# Patient Record
Sex: MALE | Race: BLACK OR AFRICAN AMERICAN | NOT HISPANIC OR LATINO | Employment: FULL TIME | URBAN - NONMETROPOLITAN AREA
[De-identification: names, ages, dates, MRNs, and addresses within clinical notes are randomized per-mention and may not be internally consistent; named-entity substitution may affect disease eponyms.]

---

## 2018-03-21 ENCOUNTER — HOSPITAL ENCOUNTER (EMERGENCY)
Facility: HOSPITAL | Age: 28
Discharge: HOME | End: 2018-03-21
Admitting: EMERGENCY MEDICINE

## 2018-03-21 ENCOUNTER — APPOINTMENT (OUTPATIENT)
Dept: GENERAL RADIOLOGY | Facility: HOSPITAL | Age: 28
End: 2018-03-21

## 2018-03-21 VITALS
DIASTOLIC BLOOD PRESSURE: 83 MMHG | HEART RATE: 73 BPM | TEMPERATURE: 98.1 F | WEIGHT: 144 LBS | BODY MASS INDEX: 21.82 KG/M2 | RESPIRATION RATE: 18 BRPM | HEIGHT: 68 IN | OXYGEN SATURATION: 100 % | SYSTOLIC BLOOD PRESSURE: 129 MMHG

## 2018-03-21 DIAGNOSIS — S39.012A STRAIN OF LUMBAR REGION, INITIAL ENCOUNTER: ICD-10-CM
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
## 2018-03-21 PROCEDURE — 99282 EMERGENCY DEPT VISIT SF MDM: CPT

## 2018-03-21 PROCEDURE — 72110 X-RAY EXAM L-2 SPINE 4/>VWS: CPT

## 2018-03-21 RX ORDER — IBUPROFEN 800 MG/1
800 TABLET ORAL
Qty: 30 TABLET | Refills: 0 | OUTPATIENT
Start: 2018-03-21 | End: 2020-10-09

## 2018-03-21 RX ORDER — CYCLOBENZAPRINE HCL 10 MG
10 TABLET ORAL 3 TIMES DAILY PRN
Qty: 21 TABLET | Refills: 0 | Status: SHIPPED | OUTPATIENT
Start: 2018-03-21 | End: 2021-01-24
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
## 2018-07-15 ENCOUNTER — HOSPITAL ENCOUNTER (EMERGENCY)
Facility: HOSPITAL | Age: 28
Discharge: HOME | End: 2018-07-15
Admitting: EMERGENCY MEDICINE

## 2018-07-15 ENCOUNTER — APPOINTMENT (OUTPATIENT)
Dept: GENERAL RADIOLOGY | Facility: HOSPITAL | Age: 28
End: 2018-07-15

## 2018-07-15 VITALS
HEART RATE: 60 BPM | TEMPERATURE: 98.5 F | OXYGEN SATURATION: 99 % | DIASTOLIC BLOOD PRESSURE: 64 MMHG | HEIGHT: 68 IN | RESPIRATION RATE: 18 BRPM | WEIGHT: 150 LBS | SYSTOLIC BLOOD PRESSURE: 125 MMHG | BODY MASS INDEX: 22.73 KG/M2

## 2018-07-15 DIAGNOSIS — S93.602A FOOT SPRAIN, LEFT, INITIAL ENCOUNTER: Primary | ICD-10-CM

## 2018-07-15 PROCEDURE — 73630 X-RAY EXAM OF FOOT: CPT

## 2018-07-15 PROCEDURE — 99283 EMERGENCY DEPT VISIT LOW MDM: CPT

## 2018-07-15 NOTE — DISCHARGE INSTRUCTIONS
Return to ER if symptoms worsen   Ice to foot for 24 hours, then moist heat compresses three times a day for 15-20 min intervals      Cryotherapy  WHAT IS CRYOTHERAPY?  Cryotherapy, or cold therapy, is a treatment that uses cold temperatures to treat an injury or medical condition. It includes using cold packs or ice packs to reduce pain and swelling. Only use cryotherapy if your doctor says it is okay.  HOW DO I USE CRYOTHERAPY?  · Place a towel between the cold source and your skin.  · Apply the cold source for no more than 20 minutes at a time.  · Check your skin after 5 minutes to make sure there are no signs of a poor response to cold or skin damage. Check for:  ? White spots on your skin. Your skin may look blotchy or mottled.  ? Skin that looks blue or pale.  ? Skin that feels waxy or hard.  · Repeat these steps as many times each day as told by your doctor.    HOW CAN I MAKE A COLD PACK?  When using a cold pack at home to reduce pain and swelling, you can use:  · A silica gel cold pack that has been left in the freezer. You can buy this online or in stores.  · A plastic bag of frozen vegetables.  · A sealable plastic bag that has been filled with crushed ice.    Always wrap the pack in a dry or damp towel to avoid direct contact with your skin.  WHEN SHOULD I CALL MY DOCTOR?  Call your doctor if:  · You start to have white spots on your skin. This may give your skin a blotchy or mottled look.  · Your skin turns blue or pale.  · Your skin becomes waxy or hard.  · Your swelling gets worse.    This information is not intended to replace advice given to you by your health care provider. Make sure you discuss any questions you have with your health care provider.  Document Released: 06/05/2009 Document Revised: 05/25/2017 Document Reviewed: 08/31/2016  [employer] Interactive Patient Education © 2017 [employer]    Foot Sprain  A foot sprain is an injury to one of the strong bands of tissue (ligaments) that  connect and support the many bones in your feet. The ligament can be stretched too much or it can tear. A tear can be either partial or complete. The severity of the sprain depends on how much of the ligament was damaged or torn.  What are the causes?  A foot sprain is usually caused by suddenly twisting or pivoting your foot.  What increases the risk?  This injury is more likely to occur in people who:  · Play a sport, such as basketball or football.  · Exercise or play a sport without warming up.  · Start a new workout or sport.  · Suddenly increase how long or hard they exercise or play a sport.    What are the signs or symptoms?  Symptoms of this condition start soon after an injury and include:  · Pain, especially in the arch of the foot.  · Bruising.  · Swelling.  · Inability to walk or use the foot to support body weight.    How is this diagnosed?  This condition is diagnosed with a medical history and physical exam. You may also have imaging tests, such as:  · X-rays to make sure there are no broken bones (fractures).  · MRI to see if the ligament has torn.    How is this treated?  Treatment varies depending on the severity of your sprain. Mild sprains can be treated with rest, ice, compression, and elevation (RICE). If your ligament is overstretched or partially torn, treatment usually involves keeping your foot in a fixed position (immobilization) for a period of time. To help you do this, your health care provider will apply a bandage, splint, or walking boot to keep your foot from moving until it heals. You may also be advised to use crutches or a scooter for a few weeks to avoid bearing weight on your foot while it is healing.  If your ligament is fully torn, you may need surgery to reconnect the ligament to the bone. After surgery, a cast or splint will be applied and will need to stay on your foot while it heals.  Your health care provider may also suggest exercises or physical therapy to strengthen  your foot.  Follow these instructions at home:  If You Have a Bandage, Splint, or Walking Boot:  · Wear it as directed by your health care provider. Remove it only as directed by your health care provider.  · Loosen the bandage, splint, or walking boot if your toes become numb and tingle, or if they turn cold and blue.  Bathing  · If your health care provider approves bathing and showering, cover the bandage or splint with a watertight plastic bag to protect it from water. Do not let the bandage or splint get wet.  Managing pain, stiffness, and swelling  · If directed, apply ice to the injured area:  ? Put ice in a plastic bag.  ? Place a towel between your skin and the bag.  ? Leave the ice on for 20 minutes, 2-3 times per day.  · Move your toes often to avoid stiffness and to lessen swelling.  · Raise (elevate) the injured area above the level of your heart while you are sitting or lying down.  Driving  · Do not drive or operate heavy machinery while taking pain medicine.  · Ask your health care provider when it is safe to drive if you have a bandage, splint, or walking boot on your foot.  Activity  · Rest as directed by your health care provider.  · Do not use the injured foot to support your body weight until your health care provider says that you can. Use crutches or other supportive devices as directed by your health care provider.  · Ask your health care provider what activities are safe for you. Gradually increase how much and how far you walk until your health care provider says it is safe to return to full activity.  · Do any exercise or physical therapy as directed by your health care provider.  General instructions  · If a splint was applied, do not put pressure on any part of it until it is fully hardened. This may take several hours.  · Take medicines only as directed by your health care provider. These include over-the-counter medicines and prescription medicines.  · Keep all follow-up visits as  directed by your health care provider. This is important.  · When you can walk without pain, wear supportive shoes that have stiff soles. Do not wear flip-flops, and do not walk barefoot.  Contact a health care provider if:  · Your pain is not controlled with medicine.  · Your bruising or swelling gets worse or does not get better with treatment.  · Your splint or walking boot is damaged.  Get help right away if:  · You develop severe numbness or tingling in your foot.  · Your foot turns blue, white, or gray, and it feels cold.  This information is not intended to replace advice given to you by your health care provider. Make sure you discuss any questions you have with your health care provider.  Document Released: 06/09/2003 Document Revised: 05/25/2017 Document Reviewed: 10/21/2015  [employer] Interactive Patient Education © 2018 [employer]

## 2018-07-15 NOTE — ED PROVIDER NOTES
Subjective   Patient is 27-year-old white male presents with left foot pain for the past 3 days.  He denies any known injury.  He states he does work [employer] and does not know if he hurt it while working. He states that most of the pain is to the base of the 4th and 5th toes.         History provided by:  Patient   used: No        Review of Systems   Constitutional: Negative.    HENT: Negative.    Eyes: Negative.    Respiratory: Negative.    Cardiovascular: Negative.    Gastrointestinal: Negative.    Endocrine: Negative.    Genitourinary: Negative.    Musculoskeletal:        Patient is 27-year-old white male presents with left foot pain for the past 3 days.  He denies any known injury.  He states he does work [employer] and does not know if he hurt it while working. He states that most of the pain is to the base of the 4th and 5th toes.      Skin: Negative.    Allergic/Immunologic: Negative.    Neurological: Negative.    Hematological: Negative.    Psychiatric/Behavioral: Negative.    All other systems reviewed and are negative.      Past Medical History:   Diagnosis Date   • Patient denies medical problems        No Known Allergies    Past Surgical History:   Procedure Laterality Date   • NO PAST SURGERIES         History reviewed. No pertinent family history.    Social History     Social History   • Marital status: Single     Social History Main Topics   • Smoking status: Current Every Day Smoker     Packs/day: 0.50     Types: Cigarettes   • Smokeless tobacco: Never Used   • Alcohol use No   • Drug use: No   • Sexual activity: Defer     Other Topics Concern   • Not on file       Prior to Admission medications    Medication Sig Start Date End Date Taking? Authorizing Provider   cyclobenzaprine (FLEXERIL) 10 MG tablet Take 1 tablet by mouth 3 (Three) Times a Day As Needed for Muscle Spasms. 3/21/18   Thu-SHADY Bowers   ibuprofen (ADVIL,MOTRIN) 800 MG tablet Take 1 tablet by  "mouth 3 (Three) Times a Day With Meals. 3/21/18   Thu-Concepción SHADY Price       /64   Pulse 60   Temp 98.5 °F (36.9 °C)   Resp 18   Ht 172.7 cm (68\")   Wt 68 kg (150 lb)   SpO2 99%   BMI 22.81 kg/m²     Objective   Physical Exam   Constitutional: He is oriented to person, place, and time. He appears well-developed and well-nourished.   HENT:   Head: Normocephalic and atraumatic.   Eyes: Conjunctivae and EOM are normal. Pupils are equal, round, and reactive to light.   Neck: Normal range of motion. Neck supple.   Cardiovascular: Normal rate, regular rhythm, normal heart sounds and intact distal pulses.    Pulmonary/Chest: Effort normal and breath sounds normal.   Abdominal: Soft. Bowel sounds are normal.   Musculoskeletal:   Left foot: tenderness on palpation of dorsal aspect of left foot at base of 4th and fifth digits. Mild soft tissue swelling noted. Pedal pulses palp. dtrs intact   Neurological: He is alert and oriented to person, place, and time. He has normal reflexes.   Skin: Skin is warm and dry.   Psychiatric: He has a normal mood and affect. His behavior is normal. Judgment and thought content normal.   Nursing note and vitals reviewed.      Procedures         Lab Results (last 24 hours)     ** No results found for the last 24 hours. **          XR Foot 3+ View Left   Final Result   No acute bony abnormality appreciated.   This report was finalized on 07/15/2018 10:35 by Dr. Pete Zendejas MD.          ED Course  ED Course as of Jul 16 1213   Sun Jul 15, 2018   1028 Reviewed results with pt. Pt will be placed in ortho boot and advised if no improvement in 3 days, needs to follow up with pcp. Will be discharged shortly in stable condition   [CW]      ED Course User Index  [CW] Roxana Turcios, JOSE          MDM  Number of Diagnoses or Management Options  Foot sprain, left, initial encounter: minor     Amount and/or Complexity of Data Reviewed  Tests in the radiology section of CPT®: " ordered and reviewed    Patient Progress  Patient progress: stable      Final diagnoses:   Foot sprain, left, initial encounter          Roxana Turcios, APRN  07/16/18 [id number]

## 2020-10-09 ENCOUNTER — APPOINTMENT (OUTPATIENT)
Dept: GENERAL RADIOLOGY | Facility: HOSPITAL | Age: 30
End: 2020-10-09

## 2020-10-09 ENCOUNTER — HOSPITAL ENCOUNTER (EMERGENCY)
Facility: HOSPITAL | Age: 30
Discharge: HOME | End: 2020-10-09
Admitting: EMERGENCY MEDICINE

## 2020-10-09 VITALS
WEIGHT: 142 LBS | RESPIRATION RATE: 16 BRPM | TEMPERATURE: 98.9 F | BODY MASS INDEX: 21.52 KG/M2 | SYSTOLIC BLOOD PRESSURE: 121 MMHG | HEART RATE: 61 BPM | OXYGEN SATURATION: 100 % | DIASTOLIC BLOOD PRESSURE: 70 MMHG | HEIGHT: 68 IN

## 2020-10-09 DIAGNOSIS — L02.619 FOOT ABSCESS: Primary | ICD-10-CM

## 2020-10-09 LAB
ALBUMIN SERPL-MCNC: 4.5 G/DL (ref 3.5–5.2)
ALBUMIN/GLOB SERPL: 1.5 G/DL
ALP SERPL-CCNC: 73 U/L (ref 39–117)
ALT SERPL W P-5'-P-CCNC: 7 U/L (ref 1–41)
AMPHET+METHAMPHET UR QL: NEGATIVE
AMPHETAMINES UR QL: NEGATIVE
ANION GAP SERPL CALCULATED.3IONS-SCNC: 4 MMOL/L (ref 5–15)
AST SERPL-CCNC: 17 U/L (ref 1–40)
BARBITURATES UR QL SCN: NEGATIVE
BASOPHILS # BLD AUTO: 0.03 10*3/MM3 (ref 0–0.2)
BASOPHILS NFR BLD AUTO: 0.7 % (ref 0–1.5)
BENZODIAZ UR QL SCN: NEGATIVE
BILIRUB SERPL-MCNC: 0.9 MG/DL (ref 0–1.2)
BUN SERPL-MCNC: 9 MG/DL (ref 6–20)
BUN/CREAT SERPL: 11.3 (ref 7–25)
BUPRENORPHINE SERPL-MCNC: NEGATIVE NG/ML
CALCIUM SPEC-SCNC: 9.6 MG/DL (ref 8.6–10.5)
CANNABINOIDS SERPL QL: POSITIVE
CHLORIDE SERPL-SCNC: 102 MMOL/L (ref 98–107)
CO2 SERPL-SCNC: 29 MMOL/L (ref 22–29)
COCAINE UR QL: NEGATIVE
CREAT SERPL-MCNC: 0.8 MG/DL (ref 0.76–1.27)
D-LACTATE SERPL-SCNC: 0.8 MMOL/L (ref 0.5–2)
DEPRECATED RDW RBC AUTO: 44.2 FL (ref 37–54)
EOSINOPHIL # BLD AUTO: 0.06 10*3/MM3 (ref 0–0.4)
EOSINOPHIL NFR BLD AUTO: 1.5 % (ref 0.3–6.2)
ERYTHROCYTE [DISTWIDTH] IN BLOOD BY AUTOMATED COUNT: 12.9 % (ref 12.3–15.4)
GFR SERPL CREATININE-BSD FRML MDRD: 140 ML/MIN/1.73
GLOBULIN UR ELPH-MCNC: 3 GM/DL
GLUCOSE SERPL-MCNC: 115 MG/DL (ref 65–99)
HCT VFR BLD AUTO: 42.8 % (ref 37.5–51)
HGB BLD-MCNC: 14.7 G/DL (ref 13–17.7)
HOLD SPECIMEN: NORMAL
IMM GRANULOCYTES # BLD AUTO: 0 10*3/MM3 (ref 0–0.05)
IMM GRANULOCYTES NFR BLD AUTO: 0 % (ref 0–0.5)
LYMPHOCYTES # BLD AUTO: 1.53 10*3/MM3 (ref 0.7–3.1)
LYMPHOCYTES NFR BLD AUTO: 38.1 % (ref 19.6–45.3)
MCH RBC QN AUTO: 32.4 PG (ref 26.6–33)
MCHC RBC AUTO-ENTMCNC: 34.3 G/DL (ref 31.5–35.7)
MCV RBC AUTO: 94.3 FL (ref 79–97)
METHADONE UR QL SCN: NEGATIVE
MONOCYTES # BLD AUTO: 0.53 10*3/MM3 (ref 0.1–0.9)
MONOCYTES NFR BLD AUTO: 13.2 % (ref 5–12)
NEUTROPHILS NFR BLD AUTO: 1.87 10*3/MM3 (ref 1.7–7)
NEUTROPHILS NFR BLD AUTO: 46.5 % (ref 42.7–76)
NRBC BLD AUTO-RTO: 0 /100 WBC (ref 0–0.2)
OPIATES UR QL: POSITIVE
OXYCODONE UR QL SCN: NEGATIVE
PCP UR QL SCN: NEGATIVE
PLATELET # BLD AUTO: 276 10*3/MM3 (ref 140–450)
PMV BLD AUTO: 9.9 FL (ref 6–12)
POTASSIUM SERPL-SCNC: 3.7 MMOL/L (ref 3.5–5.2)
PROPOXYPH UR QL: NEGATIVE
PROT SERPL-MCNC: 7.5 G/DL (ref 6–8.5)
RBC # BLD AUTO: 4.54 10*6/MM3 (ref 4.14–5.8)
SODIUM SERPL-SCNC: 135 MMOL/L (ref 136–145)
TRICYCLICS UR QL SCN: NEGATIVE
WBC # BLD AUTO: 4.02 10*3/MM3 (ref 3.4–10.8)
WHOLE BLOOD HOLD SPECIMEN: NORMAL
WHOLE BLOOD HOLD SPECIMEN: NORMAL

## 2020-10-09 PROCEDURE — 25010000002 TDAP 5-2.5-18.5 LF-MCG/0.5 SUSPENSION: Performed by: NURSE PRACTITIONER

## 2020-10-09 PROCEDURE — 96365 THER/PROPH/DIAG IV INF INIT: CPT

## 2020-10-09 PROCEDURE — 87040 BLOOD CULTURE FOR BACTERIA: CPT | Performed by: NURSE PRACTITIONER

## 2020-10-09 PROCEDURE — 83605 ASSAY OF LACTIC ACID: CPT | Performed by: NURSE PRACTITIONER

## 2020-10-09 PROCEDURE — 99283 EMERGENCY DEPT VISIT LOW MDM: CPT

## 2020-10-09 PROCEDURE — 36415 COLL VENOUS BLD VENIPUNCTURE: CPT

## 2020-10-09 PROCEDURE — 90471 IMMUNIZATION ADMIN: CPT | Performed by: NURSE PRACTITIONER

## 2020-10-09 PROCEDURE — 90715 TDAP VACCINE 7 YRS/> IM: CPT | Performed by: NURSE PRACTITIONER

## 2020-10-09 PROCEDURE — 85025 COMPLETE CBC W/AUTO DIFF WBC: CPT | Performed by: NURSE PRACTITIONER

## 2020-10-09 PROCEDURE — 80306 DRUG TEST PRSMV INSTRMNT: CPT | Performed by: NURSE PRACTITIONER

## 2020-10-09 PROCEDURE — 80053 COMPREHEN METABOLIC PANEL: CPT | Performed by: NURSE PRACTITIONER

## 2020-10-09 PROCEDURE — 73630 X-RAY EXAM OF FOOT: CPT

## 2020-10-09 RX ORDER — CLINDAMYCIN HYDROCHLORIDE 300 MG/1
300 CAPSULE ORAL 3 TIMES DAILY
Qty: 21 CAPSULE | Refills: 0 | Status: SHIPPED | OUTPATIENT
Start: 2020-10-09 | End: 2020-10-16

## 2020-10-09 RX ORDER — NAPROXEN 500 MG/1
500 TABLET ORAL 2 TIMES DAILY PRN
Qty: 10 TABLET | Refills: 0 | Status: SHIPPED | OUTPATIENT
Start: 2020-10-09 | End: 2020-10-14

## 2020-10-09 RX ORDER — CLINDAMYCIN PHOSPHATE 900 MG/50ML
900 INJECTION INTRAVENOUS ONCE
Status: COMPLETED | OUTPATIENT
Start: 2020-10-09 | End: 2020-10-09

## 2020-10-09 RX ORDER — SODIUM CHLORIDE 0.9 % (FLUSH) 0.9 %
10 SYRINGE (ML) INJECTION AS NEEDED
Status: DISCONTINUED | OUTPATIENT
Start: 2020-10-09 | End: 2020-10-09 | Stop reason: HOSPADM

## 2020-10-09 RX ADMIN — TETANUS TOXOID, REDUCED DIPHTHERIA TOXOID AND ACELLULAR PERTUSSIS VACCINE, ADSORBED 0.5 ML: 5; 2.5; 8; 8; 2.5 SUSPENSION INTRAMUSCULAR at 13:57

## 2020-10-09 RX ADMIN — CLINDAMYCIN IN 5 PERCENT DEXTROSE 900 MG: 18 INJECTION, SOLUTION INTRAVENOUS at 12:26

## 2020-10-09 NOTE — ED PROVIDER NOTES
Subjective   Patient is a 29-year-old male that presents to the ER today from urgent care with complaint of foot pain.  The patient states that for approximately 1 week ago he wore an ill fitting shoe.  He states that he developed a sore to the dorsal aspect of his foot below the fourth and fifth toes.  The patient states that he has now developed an abscess to the area that it is sore.  He was seen in urgent care and sent to the ER for further evaluation.  The patient is unsure when his last tetanus vaccination was.  He has not been on any antibiotics for this.  He is not a diabetic.  He presents here today for further evaluation.      History provided by:  Patient   used: No    Foot Pain  Location:  Abscess to dorsal aspect foot below 4/5 toes x 1 week  Severity:  Moderate  Onset quality:  Sudden  Duration:  1 week  Timing:  Constant  Progression:  Worsening  Chronicity:  New  Associated symptoms: no abdominal pain, no chest pain, no congestion, no cough, no diarrhea, no ear pain, no fatigue, no fever, no headaches, no loss of consciousness, no myalgias, no nausea, no rash, no rhinorrhea, no shortness of breath, no sore throat, no vomiting and no wheezing        Review of Systems   Constitutional: Negative for fatigue and fever.   HENT: Negative for congestion, ear pain, rhinorrhea and sore throat.    Respiratory: Negative for cough, shortness of breath and wheezing.    Cardiovascular: Negative for chest pain.   Gastrointestinal: Negative for abdominal pain, diarrhea, nausea and vomiting.   Musculoskeletal: Negative for myalgias.   Skin: Positive for wound. Negative for rash.   Neurological: Negative for loss of consciousness and headaches.   All other systems reviewed and are negative.      Past Medical History:   Diagnosis Date   • Patient denies medical problems        No Known Allergies    Past Surgical History:   Procedure Laterality Date   • NO PAST SURGERIES         History reviewed. No  pertinent family history.    Social History     Socioeconomic History   • Marital status: Single     Spouse name: Not on file   • Number of children: Not on file   • Years of education: Not on file   • Highest education level: Not on file   Tobacco Use   • Smoking status: Current Every Day Smoker     Packs/day: 0.50     Types: Cigarettes   • Smokeless tobacco: Never Used   Substance and Sexual Activity   • Alcohol use: No   • Drug use: No   • Sexual activity: Defer           Objective   Physical Exam  Vitals signs and nursing note reviewed.   Constitutional:       Appearance: Normal appearance.   HENT:      Head: Normocephalic and atraumatic.   Eyes:      Conjunctiva/sclera: Conjunctivae normal.   Cardiovascular:      Rate and Rhythm: Normal rate and regular rhythm.   Pulmonary:      Effort: Pulmonary effort is normal.      Breath sounds: Normal breath sounds.   Musculoskeletal:        Feet:    Skin:     General: Skin is warm and dry.      Capillary Refill: Capillary refill takes less than 2 seconds.   Neurological:      General: No focal deficit present.      Mental Status: He is alert.   Psychiatric:         Mood and Affect: Mood normal.         Procedures           ED Course  ED Course as of Oct 09 1642   Fri Oct 09, 2020   1340 Call placed to Dr. Serrano, podiatry.    [LF]   1641 Discussed pt case with  who agrees with plan of care.  Patient did receive a Tdap as well as clindamycin IV.  I discussed the patient's case with Dr. Serrano.  He will see the patient in the office on Monday at 2:45 PM.  I advised the patient of this.  He will be discharged home at this time stable condition.    [LF]      ED Course User Index  [LF] Rachael Bo, APRN                                   XR Foot 3+ View Left   Final Result   1. No acute bony abnormality. Mild hallux valgus deformity of the first   MTP joint with mild bunion formation.   2. Focal soft tissue swelling of the distal lateral foot.       This report  was finalized on 10/09/2020 12:47 by Dr. Xander Fountain MD.        Labs Reviewed   COMPREHENSIVE METABOLIC PANEL - Abnormal; Notable for the following components:       Result Value    Glucose 115 (*)     Sodium 135 (*)     Anion Gap 4.0 (*)     All other components within normal limits    Narrative:     GFR Normal >60  Chronic Kidney Disease <60  Kidney Failure <15     URINE DRUG SCREEN - Abnormal; Notable for the following components:    THC, Screen, Urine Positive (*)     Opiate Screen Positive (*)     All other components within normal limits    Narrative:     Cutoff For Drugs Screened:    Amphetamines               500 ng/ml  Barbiturates               200 ng/ml  Benzodiazepines            150 ng/ml  Cocaine                    150 ng/ml  Methadone                  200 ng/ml  Opiates                    100 ng/ml  Phencyclidine               25 ng/ml  THC                            50 ng/ml  Methamphetamine            500 ng/ml  Tricyclic Antidepressants  300 ng/ml  Oxycodone                  100 ng/ml  Propoxyphene               300 ng/ml  Buprenorphine               10 ng/ml    The normal value for all drugs tested is negative. This report includes unconfirmed screening results, with the cutoff values listed, to be used for medical treatment purposes only.  Unconfirmed results must not be used for non-medical purposes such as employment or legal testing.  Clinical consideration should be applied to any drug of abuse test, particularly when unconfirmed results are used.     CBC WITH AUTO DIFFERENTIAL - Abnormal; Notable for the following components:    Monocyte % 13.2 (*)     All other components within normal limits   LACTIC ACID, PLASMA - Normal   BLOOD CULTURE WITH MARILEE   BLOOD CULTURE WITH MARILEE   RAINBOW DRAW    Narrative:     The following orders were created for panel order [place] Draw.  Procedure                               Abnormality         Status                     ---------                                -----------         ------                     Light Blue Top[[id number]]                                   Final result               Green Top (Gel)[[id number]]                                  Final result               Lavender Top[[id number]]                                     Final result               Red Top[[id number]]                                          Final result               [place] Blood Culture Fadi...[[id number]]                      Final result               Gray Top - Ice[[id number]]                                   Final result                 Please view results for these tests on the individual orders.   GRAY TOP - ICE   LIGHT BLUE TOP   GREEN TOP   LAVENDER TOP   RED TOP   RAINBOW BLOOD CULTURE BOTTLES - 1 SET   CBC AND DIFFERENTIAL    Narrative:     The following orders were created for panel order CBC & Differential.  Procedure                               Abnormality         Status                     ---------                               -----------         ------                     CBC Auto Differential[[id number]]        Abnormal            Final result                 Please view results for these tests on the individual orders.             MDM  Number of Diagnoses or Management Options  Foot abscess: new and requires workup     Amount and/or Complexity of Data Reviewed  Clinical lab tests: ordered and reviewed  Tests in the radiology section of CPT®: ordered and reviewed  Discuss the patient with other providers: yes    Patient Progress  Patient progress: stable      Final diagnoses:   Foot abscess            Rachael Bo, APRN  10/09/20 0788

## 2020-10-12 ENCOUNTER — APPOINTMENT (OUTPATIENT)
Dept: WOUND CARE | Facility: HOSPITAL | Age: 30
End: 2020-10-12

## 2020-10-14 LAB
BACTERIA SPEC AEROBE CULT: NORMAL
BACTERIA SPEC AEROBE CULT: NORMAL

## 2021-01-24 PROCEDURE — 87147 CULTURE TYPE IMMUNOLOGIC: CPT | Performed by: NURSE PRACTITIONER

## 2021-01-24 PROCEDURE — 87070 CULTURE OTHR SPECIMN AEROBIC: CPT | Performed by: NURSE PRACTITIONER

## 2021-01-24 PROCEDURE — 87186 SC STD MICRODIL/AGAR DIL: CPT | Performed by: NURSE PRACTITIONER

## 2021-01-24 PROCEDURE — 87205 SMEAR GRAM STAIN: CPT | Performed by: NURSE PRACTITIONER

## 2022-12-06 ENCOUNTER — HOSPITAL ENCOUNTER (EMERGENCY)
Facility: HOSPITAL | Age: 32
Discharge: HOME | End: 2022-12-06
Admitting: EMERGENCY MEDICINE

## 2022-12-06 ENCOUNTER — APPOINTMENT (OUTPATIENT)
Dept: CT IMAGING | Facility: HOSPITAL | Age: 32
End: 2022-12-06

## 2022-12-06 VITALS
OXYGEN SATURATION: 100 % | DIASTOLIC BLOOD PRESSURE: 72 MMHG | HEART RATE: 60 BPM | SYSTOLIC BLOOD PRESSURE: 102 MMHG | BODY MASS INDEX: 23.34 KG/M2 | RESPIRATION RATE: 16 BRPM | WEIGHT: 154 LBS | HEIGHT: 68 IN | TEMPERATURE: 97.5 F

## 2022-12-06 DIAGNOSIS — S39.012A STRAIN OF LUMBAR REGION, INITIAL ENCOUNTER: ICD-10-CM

## 2022-12-06 DIAGNOSIS — S16.1XXA STRAIN OF NECK MUSCLE, INITIAL ENCOUNTER: ICD-10-CM
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
## 2022-12-06 PROCEDURE — 72125 CT NECK SPINE W/O DYE: CPT

## 2022-12-06 PROCEDURE — 99282 EMERGENCY DEPT VISIT SF MDM: CPT

## 2022-12-06 PROCEDURE — 72131 CT LUMBAR SPINE W/O DYE: CPT

## 2022-12-06 RX ORDER — CYCLOBENZAPRINE HCL 10 MG
10 TABLET ORAL 2 TIMES DAILY PRN
Qty: 20 TABLET | Refills: 0 | Status: SHIPPED | OUTPATIENT
Start: 2022-12-06 | End: 2022-12-14 | Stop reason: SDUPTHER

## 2022-12-06 RX ORDER — IBUPROFEN 800 MG/1
800 TABLET ORAL EVERY 8 HOURS PRN
Qty: 30 TABLET | Refills: 0 | Status: SHIPPED | OUTPATIENT
Start: 2022-12-06 | End: 2022-12-16

## 2022-12-06 NOTE — DISCHARGE INSTRUCTIONS
Increase fluids.  Medication as ordered.  Ice to affected areas.  Follow up with PCP - call tomorrow for appointment. Return to ED if condition does not improve or worsens
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
## 2022-12-07 ENCOUNTER — PATIENT OUTREACH (OUTPATIENT)
Dept: CASE MANAGEMENT | Facility: OTHER | Age: 32
End: 2022-12-07
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
## 2022-12-13 ENCOUNTER — TELEPHONE (OUTPATIENT)
Dept: FAMILY MEDICINE CLINIC | Facility: CLINIC | Age: 32
End: 2022-12-13
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
## 2022-12-14 ENCOUNTER — PATIENT ROUNDING (BHMG ONLY) (OUTPATIENT)
Dept: INTERNAL MEDICINE | Facility: CLINIC | Age: 32
End: 2022-12-14

## 2022-12-14 ENCOUNTER — OFFICE VISIT (OUTPATIENT)
Dept: INTERNAL MEDICINE | Facility: CLINIC | Age: 32
End: 2022-12-14

## 2022-12-14 VITALS
HEIGHT: 68 IN | OXYGEN SATURATION: 100 % | BODY MASS INDEX: 23.49 KG/M2 | HEART RATE: 70 BPM | SYSTOLIC BLOOD PRESSURE: 118 MMHG | DIASTOLIC BLOOD PRESSURE: 74 MMHG | WEIGHT: 155 LBS

## 2022-12-14 DIAGNOSIS — S16.1XXA STRAIN OF NECK MUSCLE, INITIAL ENCOUNTER: ICD-10-CM

## 2022-12-14 DIAGNOSIS — S39.012A STRAIN OF LUMBAR REGION, INITIAL ENCOUNTER: Primary | ICD-10-CM

## 2022-12-14 PROCEDURE — 99214 OFFICE O/P EST MOD 30 MIN: CPT | Performed by: NURSE PRACTITIONER

## 2022-12-14 RX ORDER — METHYLPREDNISOLONE 4 MG/1
TABLET ORAL
Qty: 1 EACH | Refills: 0 | Status: SHIPPED | OUTPATIENT
Start: 2022-12-14 | End: 2023-01-17

## 2022-12-14 RX ORDER — CYCLOBENZAPRINE HCL 10 MG
10 TABLET ORAL 3 TIMES DAILY PRN
Qty: 60 TABLET | Refills: 0 | Status: SHIPPED | OUTPATIENT
Start: 2022-12-14 | End: 2023-01-17 | Stop reason: SDUPTHER
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
## 2022-12-14 NOTE — PROGRESS NOTES
December 14, 2022    Hello, may I speak with Jamar Bruno?    My name is Karoline Chang    I am  with [facility]  [address], [address]  [facility] [address] [phone].    Before we get started may I verify your date of birth? 1990    I am calling to officially welcome you to our practice and ask about your recent visit. Is this a good time to talk? yes    Tell me about your visit with us. What things went well? I liked how she checked everything and made sure I was ok.        We're always looking for ways to make our patients' experiences even better. Do you have recommendations on ways we may improve?  No, my first visit went well.    Overall were you satisfied with your first visit to our practice? yes       I appreciate you taking the time to speak with me today. Is there anything else I can do for you? no      Thank you, and have a great day.

## 2023-01-09 ENCOUNTER — TREATMENT (OUTPATIENT)
Dept: PHYSICAL THERAPY | Facility: CLINIC | Age: 33
End: 2023-01-09
Payer: COMMERCIAL

## 2023-01-09 DIAGNOSIS — S39.012A STRAIN OF LUMBAR REGION, INITIAL ENCOUNTER: Primary | ICD-10-CM

## 2023-01-09 DIAGNOSIS — S16.1XXA STRAIN OF NECK MUSCLE, INITIAL ENCOUNTER: ICD-10-CM

## 2023-01-09 PROCEDURE — 97161 PT EVAL LOW COMPLEX 20 MIN: CPT | Performed by: PHYSICAL THERAPIST

## 2023-01-09 PROCEDURE — 97014 ELECTRIC STIMULATION THERAPY: CPT | Performed by: PHYSICAL THERAPIST

## 2023-01-09 PROCEDURE — 97140 MANUAL THERAPY 1/> REGIONS: CPT | Performed by: PHYSICAL THERAPIST
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
## 2023-01-11 ENCOUNTER — TELEPHONE (OUTPATIENT)
Dept: PHYSICAL THERAPY | Facility: CLINIC | Age: 33
End: 2023-01-11

## 2023-01-17 ENCOUNTER — OFFICE VISIT (OUTPATIENT)
Dept: INTERNAL MEDICINE | Facility: CLINIC | Age: 33
End: 2023-01-17
Payer: COMMERCIAL

## 2023-01-17 VITALS
BODY MASS INDEX: 22.73 KG/M2 | HEIGHT: 68 IN | OXYGEN SATURATION: 100 % | HEART RATE: 54 BPM | WEIGHT: 150 LBS | SYSTOLIC BLOOD PRESSURE: 118 MMHG | DIASTOLIC BLOOD PRESSURE: 84 MMHG

## 2023-01-17 DIAGNOSIS — S39.012D STRAIN OF LUMBAR REGION, SUBSEQUENT ENCOUNTER: ICD-10-CM

## 2023-01-17 PROCEDURE — 99214 OFFICE O/P EST MOD 30 MIN: CPT | Performed by: NURSE PRACTITIONER

## 2023-01-17 RX ORDER — MELOXICAM 15 MG/1
15 TABLET ORAL DAILY
Qty: 30 TABLET | Refills: 0 | Status: SHIPPED | OUTPATIENT
Start: 2023-01-17 | End: 2023-02-21 | Stop reason: SDUPTHER

## 2023-01-17 RX ORDER — CYCLOBENZAPRINE HCL 10 MG
10 TABLET ORAL 3 TIMES DAILY PRN
Qty: 60 TABLET | Refills: 0 | Status: SHIPPED | OUTPATIENT
Start: 2023-01-17 | End: 2023-02-21 | Stop reason: SDUPTHER
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
## 2023-01-18 ENCOUNTER — TREATMENT (OUTPATIENT)
Dept: PHYSICAL THERAPY | Facility: CLINIC | Age: 33
End: 2023-01-18
Payer: COMMERCIAL

## 2023-01-18 DIAGNOSIS — S16.1XXA STRAIN OF NECK MUSCLE, INITIAL ENCOUNTER: ICD-10-CM

## 2023-01-18 DIAGNOSIS — S39.012A STRAIN OF LUMBAR REGION, INITIAL ENCOUNTER: Primary | ICD-10-CM

## 2023-01-18 PROCEDURE — 97032 APPL MODALITY 1+ESTIM EA 15: CPT | Performed by: PHYSICAL THERAPIST

## 2023-01-18 PROCEDURE — 97140 MANUAL THERAPY 1/> REGIONS: CPT | Performed by: PHYSICAL THERAPIST

## 2023-01-18 NOTE — PROGRESS NOTES
Physical Therapy Treatment Note  [address] [place] [address]    Patient: Jamar Bruno                                                 Visit Date: 2023  :     1990    Referring practitioner:    JOSE Govea  Date of Initial Visit:          Type: THERAPY  Noted: 2023    Patient seen for 2 sessions    Visit Diagnoses:    ICD-10-CM ICD-9-CM   1. Strain of lumbar region, initial encounter  S39.012A 847.2   2. Strain of neck muscle, initial encounter  S16.1XXA 847.0     SUBJECTIVE     Subjective He reports his back is bothering him more today denies falls or injuries.     PAIN: 7/10         OBJECTIVE     Objective      Modalities Comments   Combo Cold Laser Estim CIM B L3-5 region prone       Minutes 10     Manual Therapy     05218  Comments   STM B lower lumbar region prone   STM mid thoracic region with ratchet roller prone   thoracic extension mobilizations Grade 1 prone           Timed Minutes 33          Therapy Education/Self Care 84426   Education offered today    [employer] Code [email or web address]   Ongoing HEP   Access Code:   URL: [email or web address]  Date: 2023  Prepared by: Martin Samuel     Exercises  Cat Cow - 2 x daily - 7 x weekly - 2 sets - 10 reps  Bird Dog - 2 x daily - 7 x weekly - 2 sets - 10 reps  Quadruped Full Range Thoracic Rotation with Reach - 2 x daily - 7 x weekly - 2 sets - 10 reps      Timed Minutes        Total Timed Treatment:     43   mins  Total Time of Visit:             43   mins         ASSESSMENT/PLAN     GOALS  Goals                                          Progress Note due by 23                                                      Recert due by 23   LTG by: 6 weeks Comments Date Status   Able to fully forward bend to touch toes without limitation          TEJSA score of 5 or less         No paraspinal muscle guarding or tenderness         Able to resume work and fully  household activities without limitations         Independent with HEP for flexibility              Assessment/Plan     ASSESSMENT: No goals have been met at this time; however, today was the first treatment session following the initial evaluation. There was a minimal amount of guarding in his lower lumbar region and his mid thoracic was pretty stiff    PLAN: Continue Laserstim and STM and monitor his symptoms.    SIGNATURE: Margarito Laboy PTA, [place] License #: [id number]  Electronically Signed on 1/18/2023        [address] [place] [address]. [address]  [phone]

## 2023-01-20 ENCOUNTER — TREATMENT (OUTPATIENT)
Dept: PHYSICAL THERAPY | Facility: CLINIC | Age: 33
End: 2023-01-20
Payer: COMMERCIAL

## 2023-01-20 DIAGNOSIS — S39.012A STRAIN OF LUMBAR REGION, INITIAL ENCOUNTER: Primary | ICD-10-CM

## 2023-01-20 DIAGNOSIS — S16.1XXA STRAIN OF NECK MUSCLE, INITIAL ENCOUNTER: ICD-10-CM

## 2023-01-20 PROCEDURE — 97032 APPL MODALITY 1+ESTIM EA 15: CPT | Performed by: PHYSICAL THERAPIST

## 2023-01-20 PROCEDURE — 97140 MANUAL THERAPY 1/> REGIONS: CPT | Performed by: PHYSICAL THERAPIST

## 2023-01-20 NOTE — PROGRESS NOTES
Physical Therapy Treatment Note  [address] [place] [address]    Patient: Jamar Bruno                                                 Visit Date: 2023  :     1990    Referring practitioner:    JOSE Govea  Date of Initial Visit:          Type: THERAPY  Noted: 2023    Patient seen for 3 sessions    Visit Diagnoses:    ICD-10-CM ICD-9-CM   1. Strain of lumbar region, initial encounter  S39.012A 847.2   2. Strain of neck muscle, initial encounter  S16.1XXA 847.0     SUBJECTIVE     Subjective He reports his neck is hurting him more than anything, his back isn't really hurting today. No new changes or complaints.      PAIN: 7/10 (neck)      OBJECTIVE     Objective      Modalities Comments   Cold laser/estim combo Prone, D-C-I mode, B UT/cervical paraspinals    Tx: (back) 1 (neck) 1   Minutes 10     Manual Therapy     50837  Comments   STM with massage cream, prone B lumbar and cervical paraspinals, UT, LS, upper thoracic paraspinals, superior gluteals   IASTM with BL ridge roller, prone TL spine, gluteals   Cervical, thoracic extension mobilizations Prone -- painful in lower cervical   Gentle lumbar and sacral PA's prone       Timed Minutes 48     Therapy Education/Self Care 64406   Education offered today    [facility] Code [id number]   Ongoing HEP   Date: 2023  Prepared by: Martin Samuel     Exercises  Cat Cow - 2 x daily - 7 x weekly - 2 sets - 10 reps  Bird Dog - 2 x daily - 7 x weekly - 2 sets - 10 reps  Quadruped Full Range Thoracic Rotation with Reach - 2 x daily - 7 x weekly - 2 sets - 10 reps   Timed Minutes      Total Timed Treatment:     58   mins  Total Time of Visit:             58   mins         ASSESSMENT/PLAN     GOALS  Goals                                          Progress Note due by 23                                                      Recert due by 23   LTG by: 6 weeks Comments Date  Status   Able to fully forward bend to touch toes without limitation      ongoing   TEJAS score of 5 or less  12/50 on 1/9/23 1/9 ongoing   No paraspinal muscle guarding or tenderness     ongoing   Able to resume work and fully household activities without limitations     ongoing   Independent with HEP for flexibility Performs daily with 7 y/o daughter 1/20 ongoing     Assessment/Plan     ASSESSMENT: Patient presents today reporting his neck was hurting, though felt lumbar was much improved. Clinical availability allowed for a longer session, therefore was able to address both cervical and lumbar spine today. He demonstrated moderate guarding and was TTP at times throughout lumbar paraspinals. He also demonstrated guarding in cervical paraspinals and B UT, though was less significant vs lumbar. In prone positioning today, he demonstrated decreased lumbar lordosis and thoracic kyphosis which could contribute to some of his pain. With mobilizations to cervical spine, especially lower cervical, pt was TTP and reported pain >grade 1.     PLAN: Continue to monitor symptoms and address muscle guarding as needed. Continue with laser stim as appropriate. Potentially consider light hip, core, and postural strengthening, if tolerated.     SIGNATURE: Pam Finley PTA, [place] License #: [id number]  Electronically Signed on 1/20/2023        [address] [place] [address] [place] [address]  [phone]

## 2023-01-25 ENCOUNTER — TREATMENT (OUTPATIENT)
Dept: PHYSICAL THERAPY | Facility: CLINIC | Age: 33
End: 2023-01-25
Payer: COMMERCIAL

## 2023-01-25 DIAGNOSIS — S39.012A STRAIN OF LUMBAR REGION, INITIAL ENCOUNTER: Primary | ICD-10-CM

## 2023-01-25 DIAGNOSIS — S16.1XXA STRAIN OF NECK MUSCLE, INITIAL ENCOUNTER: ICD-10-CM

## 2023-01-25 PROCEDURE — 97110 THERAPEUTIC EXERCISES: CPT | Performed by: PHYSICAL THERAPIST

## 2023-01-25 PROCEDURE — 97535 SELF CARE MNGMENT TRAINING: CPT | Performed by: PHYSICAL THERAPIST

## 2023-01-25 PROCEDURE — 97140 MANUAL THERAPY 1/> REGIONS: CPT | Performed by: PHYSICAL THERAPIST

## 2023-01-25 NOTE — PROGRESS NOTES
Physical Therapy Treatment Note  [address] [place] [address]    Patient: Jamar Bruno                                                 Visit Date: 2023  :     1990    Referring practitioner:    JOSE Govea  Date of Initial Visit:          Type: THERAPY  Noted: 2023    Patient seen for 4 sessions    Visit Diagnoses:    ICD-10-CM ICD-9-CM   1. Strain of lumbar region, initial encounter  S39.012A 847.2   2. Strain of neck muscle, initial encounter  S16.1XXA 847.0     SUBJECTIVE     Subjective He reports his neck is hurting him more than anything, his back isn't really hurting today. (This was also the case last visit.)     PAIN: 8/10 (neck)      OBJECTIVE     Objective      Manual Therapy     05742  Comments   Cervical, thoracic extension mobilizations Prone -- sore in lower cervical   Gentle lumbar and sacral PA's prone   Supine- suboccipital release Tender initially, but got better with relaxation   C1 rotation mobs    Applied kinesiotape to B upper traps to base of head Immediately felt better and more loose.    Timed Minutes 20      Therapeutic Exercises    66399 Units Comments   Passively worked on O-A flexion/chin tuck  Tissues very tight restricting movement on occiput on atlas   Cervical rotation stretch B supine More tightness into L    Towel roll under neck in supine  Encouraged him to support the neck into neutral curve as it is straight from muscle tension             Timed Minutes 10       Therapy Education/Self Care 68263   Education offered today Reviewed HEP and technique. It continues to help.   [employer] Code [id number]   Ongoing HEP   Date: 2023  Prepared by: Martin Samuel     Exercises  Cat Cow - 2 x daily - 7 x weekly - 2 sets - 10 reps  Bird Dog - 2 x daily - 7 x weekly - 2 sets - 10 reps  Quadruped Full Range Thoracic Rotation with Reach - 2 x daily - 7 x weekly - 2 sets - 10 reps   Timed  Minutes 8     Total Timed Treatment:   38    mins  Total Time of Visit:               38mins         ASSESSMENT/PLAN     GOALS  Goals                                          Progress Note due by 2/8/23                                                      Recert due by 4/8/23   LTG by: 6 weeks Comments Date Status   Able to fully forward bend to touch toes without limitation      ongoing   TEJAS score of 5 or less  12/50 on 1/9/23 1/9 ongoing   No paraspinal muscle guarding or tenderness     ongoing   Able to resume work and fully household activities without limitations     ongoing   Perform cervical rotation WFL bilaterally with no discomfort  1/25 New   No episodes of neck pain greater then 3/10 for 2 weeks  1/25 New   Independent with HEP for flexibility Performs daily with 5 y/o daughter 1/20 ongoing     Assessment/Plan     ASSESSMENT: Patient is having more pain in his neck now than his low back. He has tightness in B SCM muscles and in the upper cervical region. We need to change focus to the neck and just continue to monitor and address the low back as needed. He is getting good relief in the low back with the HEP he got on evaluation.     PLAN: Focus on neck pain vs. Low back. New goals written today for this. Cont to address posture which will address both. Try taping again if it helps.     SIGNATURE: Sima Gomez, PT, DPT, [facility] License #: [id number]  Electronically Signed on 1/25/2023        [address] [place] [address] [place] [address]  [phone]

## 2023-01-27 ENCOUNTER — TREATMENT (OUTPATIENT)
Dept: PHYSICAL THERAPY | Facility: CLINIC | Age: 33
End: 2023-01-27
Payer: COMMERCIAL

## 2023-01-27 DIAGNOSIS — S39.012A STRAIN OF LUMBAR REGION, INITIAL ENCOUNTER: Primary | ICD-10-CM

## 2023-01-27 DIAGNOSIS — S16.1XXA STRAIN OF NECK MUSCLE, INITIAL ENCOUNTER: ICD-10-CM

## 2023-01-27 PROCEDURE — 97140 MANUAL THERAPY 1/> REGIONS: CPT | Performed by: PHYSICAL THERAPIST

## 2023-01-27 NOTE — PROGRESS NOTES
Physical Therapy Treatment Note  [address] [place] [address]    Patient: Jamar Bruno                                                 Visit Date: 2023  :     1990    Referring practitioner:    JOSE Govea  Date of Initial Visit:          Type: THERAPY  Noted: 2023    Patient seen for 5 sessions    Visit Diagnoses:    ICD-10-CM ICD-9-CM   1. Strain of lumbar region, initial encounter  S39.012A 847.2   2. Strain of neck muscle, initial encounter  S16.1XXA 847.0     SUBJECTIVE     Subjective He reports his back ok he has some pain in the R side of his neck.     PAIN: 6.5 /10         OBJECTIVE     Objective      Modalities Comments   Cold laser/estim combo  B UT/cervical paraspinals CIM Massage chair        Minutes 10     Manual Therapy     79836  Comments   STM B UT,LS, and cervical paraspinals Massage chair   STM thoracic region with ratchet roller Massage chair   Thoracic extension mobilizations Grade 1 sustained Massage chair   Cervical suboccipital releases, manual traction, and traction with B lateral bends Grade 1 HL        Timed Minutes 35            Therapy Education/Self Care 39322   Education offered today    [employer] Code [id number]   Ongoing HEP   Date: 2023  Prepared by: Martin Samuel     Exercises  Cat Cow - 2 x daily - 7 x weekly - 2 sets - 10 reps  Bird Dog - 2 x daily - 7 x weekly - 2 sets - 10 reps  Quadruped Full Range Thoracic Rotation with Reach - 2 x daily - 7 x weekly - 2 sets - 10 reps   Timed Minutes        Total Timed Treatment:     45   mins  Total Time of Visit:             45   mins         ASSESSMENT/PLAN     GOALS  Goals                                          Progress Note due by 23                                                      Recert due by 23   LTG by: 6 weeks Comments Date Status   Able to fully forward bend to touch toes without limitation      ongoing   TEJAS  score of 5 or less  12/50 on 1/9/23 1/9 ongoing   No paraspinal muscle guarding or tenderness     ongoing   Able to resume work and fully household activities without limitations     ongoing   Perform cervical rotation WFL bilaterally with no discomfort   1/25 Ongoing   No episodes of neck pain greater then 3/10 for 2 weeks  6.5/10 today 1/27 Ongoing   Independent with HEP for flexibility Performs daily with 5 y/o daughter 1/20 ongoing         Assessment/Plan     ASSESSMENT: His cervical region is giving him more pain and he doesn't report any issues with his lumbar region. He removed the tape this morning per his report so it was a bit early to reapply as the recommended time is 48 hours before reapplication.    PLAN: Continue to monitor symptoms and address muscle guarding as needed. Continue with laser stim as appropriate.    SIGNATURE: Margarito Laboy, [facility], [place] License #: [id number]  Electronically Signed on 1/27/2023        [address] [place] [address]. [address]  [phone]

## 2023-02-01 ENCOUNTER — TREATMENT (OUTPATIENT)
Dept: PHYSICAL THERAPY | Facility: CLINIC | Age: 33
End: 2023-02-01
Payer: COMMERCIAL

## 2023-02-01 DIAGNOSIS — S16.1XXA STRAIN OF NECK MUSCLE, INITIAL ENCOUNTER: ICD-10-CM

## 2023-02-01 DIAGNOSIS — S39.012A STRAIN OF LUMBAR REGION, INITIAL ENCOUNTER: Primary | ICD-10-CM

## 2023-02-01 PROCEDURE — 97014 ELECTRIC STIMULATION THERAPY: CPT | Performed by: PHYSICAL THERAPIST

## 2023-02-01 PROCEDURE — 97140 MANUAL THERAPY 1/> REGIONS: CPT | Performed by: PHYSICAL THERAPIST

## 2023-02-01 NOTE — PROGRESS NOTES
Physical Therapy Treatment Note  [address] [place] [address]    Patient: Jamar Bruno                                                 Visit Date: 2023  :     1990    Referring practitioner:    JOSE Govea  Date of Initial Visit:          Type: THERAPY  Noted: 2023    Patient seen for 6 sessions    Visit Diagnoses:    ICD-10-CM ICD-9-CM   1. Strain of lumbar region, initial encounter  S39.012A 847.2   2. Strain of neck muscle, initial encounter  S16.1XXA 847.0     SUBJECTIVE     Subjective He reports his neck still bothers him a lot no problems with his low back    PAIN: 6/10         OBJECTIVE     Objective      Modalities Comments   premodulated B UT quadripolar setup 15 mA with MH  Supine        Minutes 15     Manual Therapy     66637  Comments   STM B UT,LS, and cervical paraspinals Supine    Cervical suboccipital releases, manual traction, and traction with B lateral bends Grade 1 Supine                Timed Minutes 40          Therapy Education/Self Care 15350   Education offered today    [employer] Code [id number]   Ongoing HEP   Date: 2023  Prepared by: Martin Samuel     Exercises  Cat Cow - 2 x daily - 7 x weekly - 2 sets - 10 reps  Bird Dog - 2 x daily - 7 x weekly - 2 sets - 10 reps  Quadruped Full Range Thoracic Rotation with Reach - 2 x daily - 7 x weekly - 2 sets - 10 reps   Timed Minutes        Total Timed Treatment:     40   mins  Total Time of Visit:             55   mins         ASSESSMENT/PLAN     GOALS  Goals                                          Progress Note due by 23                                                      Recert due by 23   LTG by: 6 weeks Comments Date Status   Able to fully forward bend to touch toes without limitation      ongoing   TEJAS score of 5 or less  12/50 on 23 ongoing   No paraspinal muscle guarding or tenderness  mild guarding B LS and UT today   2/1 ongoing   Able to resume work and fully household activities without limitations     ongoing   Perform cervical rotation WFL bilaterally with no discomfort   1/25 Ongoing   No episodes of neck pain greater then 3/10 for 2 weeks  6.5/10 today 1/27 Ongoing   Independent with HEP for flexibility Performs daily with 5 y/o daughter 1/20 ongoing         Assessment/Plan     ASSESSMENT: His neck pain has been pretty persistent and what with his TEJAS I thought it would be beneficial to utilize MH and E-stim prior to manual therapy today.    PLAN: Continue to focus on his neck pain and assess her long term response to today's session.     SIGNATURE: Margarito Laboy, [facility], [place] License #: [id number]  Electronically Signed on 2/1/2023        [address] [place] [address]. [address]  [phone]

## 2023-02-03 ENCOUNTER — TREATMENT (OUTPATIENT)
Dept: PHYSICAL THERAPY | Facility: CLINIC | Age: 33
End: 2023-02-03
Payer: COMMERCIAL

## 2023-02-03 DIAGNOSIS — S39.012A STRAIN OF LUMBAR REGION, INITIAL ENCOUNTER: Primary | ICD-10-CM

## 2023-02-03 DIAGNOSIS — S16.1XXA STRAIN OF NECK MUSCLE, INITIAL ENCOUNTER: ICD-10-CM

## 2023-02-03 PROCEDURE — 97140 MANUAL THERAPY 1/> REGIONS: CPT | Performed by: PHYSICAL THERAPIST

## 2023-02-03 PROCEDURE — 97014 ELECTRIC STIMULATION THERAPY: CPT | Performed by: PHYSICAL THERAPIST

## 2023-02-03 NOTE — PROGRESS NOTES
Physical Therapy Treatment Note  [address] [place] [address]    Patient: Jamar Bruno                                                 Visit Date: 2/3/2023  :     1990    Referring practitioner:    JOSE Govea  Date of Initial Visit:          Type: THERAPY  Noted: 2023    Patient seen for 7 sessions    Visit Diagnoses:    ICD-10-CM ICD-9-CM   1. Strain of lumbar region, initial encounter  S39.012A 847.2   2. Strain of neck muscle, initial encounter  S16.1XXA 847.0     SUBJECTIVE     Subjective He reports he feels much better after last sessions wants to do it again.    PAIN: 5/10         OBJECTIVE     Objective      Modalities Comments   premodulated B UT quadripolar setup 21 mA with MH  HL          Minutes 20     Manual Therapy     65432  Comments   STM B UT,LS, and cervical paraspinals Supine    Cervical suboccipital releases, manual traction, and traction with B lateral bends Grade 1 Supine     STM mid thoracic with ratchet roller   prone               Timed Minutes 40         Therapy Education/Self Care 32561   Education offered today    [facility] Code [id number]   Ongoing HEP   Date: 2023  Prepared by: Martin Samuel     Exercises  Cat Cow - 2 x daily - 7 x weekly - 2 sets - 10 reps  Bird Dog - 2 x daily - 7 x weekly - 2 sets - 10 reps  Quadruped Full Range Thoracic Rotation with Reach - 2 x daily - 7 x weekly - 2 sets - 10 reps   Timed Minutes        Total Timed Treatment:     40   mins  Total Time of Visit:             60   mins         ASSESSMENT/PLAN     GOALS  Goals                                          Progress Note due by 23                                                      Recert due by 23   LTG by: 6 weeks Comments Date Status   Able to fully forward bend to touch toes without limitation      ongoing   TEJAS score of 5 or less  /50 on 23 ongoing   No paraspinal muscle guarding or  tenderness  mild guarding B LS and UT today  2/1 ongoing   Able to resume work and fully household activities without limitations     ongoing   Perform cervical rotation WFL bilaterally with no discomfort   1/25 Ongoing   No episodes of neck pain greater then 3/10 for 2 weeks  5/10 today 2/3 Ongoing   Independent with HEP for flexibility Performs daily with 7 y/o daughter 1/20 ongoing         Assessment/Plan     ASSESSMENT: He reports he found the last session very beneficial so we replicated that session.     PLAN: Continue to focus on his neck pain and assess his long term response to today's session.     SIGNATURE: Margarito Laboy PTA, [place] License #: [id number]  Electronically Signed on 2/3/2023        [address] [place] [address]  [phone]

## 2023-02-08 ENCOUNTER — TREATMENT (OUTPATIENT)
Dept: PHYSICAL THERAPY | Facility: CLINIC | Age: 33
End: 2023-02-08
Payer: COMMERCIAL

## 2023-02-08 DIAGNOSIS — S16.1XXA STRAIN OF NECK MUSCLE, INITIAL ENCOUNTER: ICD-10-CM

## 2023-02-08 DIAGNOSIS — S39.012A STRAIN OF LUMBAR REGION, INITIAL ENCOUNTER: Primary | ICD-10-CM

## 2023-02-08 PROCEDURE — 97140 MANUAL THERAPY 1/> REGIONS: CPT

## 2023-02-08 PROCEDURE — 97014 ELECTRIC STIMULATION THERAPY: CPT

## 2023-02-08 PROCEDURE — 97110 THERAPEUTIC EXERCISES: CPT

## 2023-02-08 NOTE — PROGRESS NOTES
Physical Therapy Treatment Note and 30 Day Progress Note  [address] [place] [address]    Patient: Jamar Bruno                                                 Visit Date: 2023  :     1990    Referring practitioner:    JOSE Govea  Date of Initial Visit:          Type: THERAPY  Noted: 2023    Patient seen for 8 sessions    Visit Diagnoses:    ICD-10-CM ICD-9-CM   1. Strain of lumbar region, initial encounter  S39.012A 847.2   2. Strain of neck muscle, initial encounter  S16.1XXA 847.0     SUBJECTIVE     Subjective He reports daily neck pain. Reports HEP compliance. Saturday he was taking his trash out and developed a sharp pain ( in R ribcage back area). His back hurts him a little more than neck today    PAIN: 6/10         OBJECTIVE     Objective      Therapeutic Exercises    96509 Units Comments   Assessed B cervical rotation AROM     Assessed forward bend     Obtained an updated TEJAS     Reviewed HEP          Timed Minutes 10        Modalities Comments   IFC quadripolar set up to B TL-lower lumbar region Prone with MH for 15 min       Minutes 20     Manual Therapy     10641  Comments   STM B lower lumbar region prone   Assessed B ribcage movement with diaphragmatic breathing in sitting symmetrical                Timed Minutes 20        Therapy Education/Self Care 80105   Education offered today posture   [employer] Code    Ongoing HEP   Date: 2023  Prepared by: Martin Samuel     Exercises  Cat Cow - 2 x daily - 7 x weekly - 2 sets - 10 reps  Bird Dog - 2 x daily - 7 x weekly - 2 sets - 10 reps  Quadruped Full Range Thoracic Rotation with Reach - 2 x daily - 7 x weekly - 2 sets - 10 reps   Timed Minutes        Total Timed Treatment:     30   mins  Total Time of Visit:             50  mins         ASSESSMENT/PLAN     GOALS  Goals                                          Progress Note due by  2/8/23                                                      Recert due by 4/8/23   LTG by: 6 weeks Comments Date Status   Able to fully forward bend to touch toes without limitation   can touch toes but increases pain  2/8 ongoing   TEJAS score of 5 or less 20/50 2/8 ongoing   No paraspinal muscle guarding or tenderness Still guarded 2/8 ongoing   Able to resume work and fully household activities without limitations Has not returned to work, reported flare after taking out the trash 2/8 ongoing   Perform cervical rotation WFL bilaterally with no discomfort R rotation 54 degrees L 40 degrees AROM 2/8 Ongoing   No episodes of neck pain greater then 3/10 for 2 weeks He reports daily neck pain 2/8 Ongoing   Independent with HEP for flexibility Reports compliance 2/8 ongoing         Assessment/Plan     ASSESSMENT: His previous TEJAS was 12/50 while today's score was 20/50. He has had a flare in his LBP since the last session and reports he felt a sharp pain in his R side while taking out the trash.     PLAN: Continue to progress conservatively and focus on the region that is bothering him the most each session.    SIGNATURE: Margarito Laboy PTA, [place] License #:   Electronically Signed on 2/8/2023        [address] [place] [address]. [address]  [phone]

## 2023-02-08 NOTE — PROGRESS NOTES
Progress Note Addendum      Patient: Jamar Bruno           : 1990  Visit Date: 2023  Referring practitioner: JOSE Govea  Date of Initial Visit: Type: THERAPY  Noted: 2023  Patient seen for 8 sessions  Visit Diagnoses:    ICD-10-CM ICD-9-CM   1. Strain of lumbar region, initial encounter  S39.012A 847.2   2. Strain of neck muscle, initial encounter  S16.1XXA 847.0          Clinical Progress: improved  Home Program Compliance: Yes  Progress toward previous goals: Partially Met  Prognosis to achieve goals: good    Objective     Assessment/Plan    I spent 2 minutes with the patient and Margarito Laboy PTA and reviewed their progress and plan of care. The patient is in agreement with this plan.     SIGNATURE: Geena Saenz PT, License #: [id number]  Electronically Signed on 2023

## 2023-02-10 ENCOUNTER — TREATMENT (OUTPATIENT)
Dept: PHYSICAL THERAPY | Facility: CLINIC | Age: 33
End: 2023-02-10
Payer: COMMERCIAL

## 2023-02-10 DIAGNOSIS — S16.1XXA STRAIN OF NECK MUSCLE, INITIAL ENCOUNTER: ICD-10-CM

## 2023-02-10 DIAGNOSIS — S39.012A STRAIN OF LUMBAR REGION, INITIAL ENCOUNTER: Primary | ICD-10-CM

## 2023-02-10 PROCEDURE — 97140 MANUAL THERAPY 1/> REGIONS: CPT | Performed by: PHYSICAL THERAPIST

## 2023-02-10 PROCEDURE — 97110 THERAPEUTIC EXERCISES: CPT | Performed by: PHYSICAL THERAPIST
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
## 2023-02-15 ENCOUNTER — TREATMENT (OUTPATIENT)
Dept: PHYSICAL THERAPY | Facility: CLINIC | Age: 33
End: 2023-02-15
Payer: COMMERCIAL

## 2023-02-15 DIAGNOSIS — S16.1XXA STRAIN OF NECK MUSCLE, INITIAL ENCOUNTER: ICD-10-CM

## 2023-02-15 DIAGNOSIS — S39.012A STRAIN OF LUMBAR REGION, INITIAL ENCOUNTER: Primary | ICD-10-CM

## 2023-02-15 PROCEDURE — 97140 MANUAL THERAPY 1/> REGIONS: CPT | Performed by: PHYSICAL THERAPIST

## 2023-02-15 PROCEDURE — 97110 THERAPEUTIC EXERCISES: CPT | Performed by: PHYSICAL THERAPIST

## 2023-02-15 NOTE — PROGRESS NOTES
"                                                                Physical Therapy Treatment Note  [address] [place] [address]    Patient: Jamar Bruno                                                 Visit Date: 2/15/2023  :     1990    Referring practitioner:    JOSE Govea  Date of Initial Visit:          Type: THERAPY  Noted: 2023    Patient seen for 10 sessions    Visit Diagnoses:    ICD-10-CM ICD-9-CM   1. Strain of lumbar region, initial encounter  S39.012A 847.2   2. Strain of neck muscle, initial encounter  S16.1XXA 847.0     SUBJECTIVE     Subjective He reports a little of both neck and back pain    PAIN: 5/10         OBJECTIVE     Objective      Therapeutic Exercises    83005 Units Comments   supine \"y's\" with yellow T band x 15      B hip ER stretches with intermittent inferior lateral glides     B SL clamshells with yellow T band x 15     supine \"t's\" with yellow T band x 15     B unilateral alt SKC's      B unilateral supine serratus punches with #2 x 15      Progressive thoracic stretch on 1/2 foam roller          Timed Minutes 40     Manual Therapy     53523  Comments   STM B UT,LS, and cervical paraspinals Massage chair                   Timed Minutes 15          Therapy Education/Self Care 02551   Education offered today    [facility] Code [id number]   Ongoing HEP   Date: 2023  Prepared by: Martin Samuel     Exercises  Cat Cow - 2 x daily - 7 x weekly - 2 sets - 10 reps  Bird Dog - 2 x daily - 7 x weekly - 2 sets - 10 reps  Quadruped Full Range Thoracic Rotation with Reach - 2 x daily - 7 x weekly - 2 sets - 10 reps   Timed Minutes        Total Timed Treatment:     55   mins  Total Time of Visit:             55   mins         ASSESSMENT/PLAN     GOALS  Goals                                          Progress Note due by 3/10/23                                                      Recert due by 23   LTG by: 6 weeks Comments Date Status   Able to fully forward " bend to touch toes without limitation   can touch toes but increases pain  2/8 ongoing   TEJAS score of 5 or less 20/50 2/8 ongoing   No paraspinal muscle guarding or tenderness Still guarded 2/8 ongoing   Able to resume work and fully household activities without limitations Has not returned to work, reported flare after taking out the trash 2/8 ongoing   Perform cervical rotation WFL bilaterally with no discomfort R rotation 54 degrees L 40 degrees AROM 2/8 Ongoing   No episodes of neck pain greater then 3/10 for 2 weeks 5/10 2/15 Ongoing   Independent with [facility] for flexibility Reports compliance; emphasized birddog and starting to build his conditioning 2/10 ongoing         Assessment/Plan     ASSESSMENT: We continued an active approach with a pattern of alternating between upper body and lower body. Time also allowed to perform some STM to his cervical region today as well.    PLAN: Continue to progress as symptoms allow.    SIGNATURE: Margarito Laboy, [facility], [place] License #: [id number]  Electronically Signed on 2/15/2023        [address] [place] [address]. [address]  [phone]

## 2023-02-17 ENCOUNTER — TREATMENT (OUTPATIENT)
Dept: PHYSICAL THERAPY | Facility: CLINIC | Age: 33
End: 2023-02-17
Payer: COMMERCIAL

## 2023-02-17 DIAGNOSIS — S39.012A STRAIN OF LUMBAR REGION, INITIAL ENCOUNTER: Primary | ICD-10-CM

## 2023-02-17 DIAGNOSIS — S16.1XXA STRAIN OF NECK MUSCLE, INITIAL ENCOUNTER: ICD-10-CM

## 2023-02-17 PROCEDURE — 97110 THERAPEUTIC EXERCISES: CPT | Performed by: PHYSICAL THERAPIST

## 2023-02-17 PROCEDURE — 97140 MANUAL THERAPY 1/> REGIONS: CPT | Performed by: PHYSICAL THERAPIST

## 2023-02-17 NOTE — PROGRESS NOTES
Physical Therapy Treatment Note  [address] [place] [address]    Patient: Jamar Bruno                                                 Visit Date: 2023  :     1990    Referring practitioner:    JOSE Govea  Date of Initial Visit:          Type: THERAPY  Noted: 2023    Patient seen for 11 sessions    Visit Diagnoses:    ICD-10-CM ICD-9-CM   1. Strain of lumbar region, initial encounter  S39.012A 847.2   2. Strain of neck muscle, initial encounter  S16.1XXA 847.0     SUBJECTIVE     Subjective His neck is feeling a whole lot better he has a little sharp pain in his low back but it's not bad    PAIN: 4/10         OBJECTIVE     Objective        Manual Therapy     56476  Comments   STM B lower lumbar region prone                   Timed Minutes 10        Therapeutic Exercises    78999 Units Comments   B hip ER stretches with intermittent inferior lateral glides     Progressive thoracic stretch on 1/2 foam roller     B uni standing hip pressdowns with blue T band x 10 each               Timed Minutes 29     Therapy Education/Self Care 16819   Education offered today    [facility] Code [id number]   Ongoing HEP   Date: 2023  Prepared by: Martin Samuel     Exercises  Cat Cow - 2 x daily - 7 x weekly - 2 sets - 10 reps  Bird Dog - 2 x daily - 7 x weekly - 2 sets - 10 reps  Quadruped Full Range Thoracic Rotation with Reach - 2 x daily - 7 x weekly - 2 sets - 10 reps   Timed Minutes        Total Timed Treatment:     39   mins  Total Time of Visit:             39   mins         ASSESSMENT/PLAN     GOALS  Goals                                          Progress Note due by 3/10/23                                                      Recert due by 23   LTG by: 6 weeks Comments Date Status   Able to fully forward bend to touch toes without limitation   can touch toes but increases pain  / ongoing   TEJAS score of 5 or less  20/50 2/8 ongoing   No paraspinal muscle guarding or tenderness Slightly guarded 2/17 ongoing   Able to resume work and fully household activities without limitations Has not returned to work, reported flare after taking out the trash 2/8 ongoing   Perform cervical rotation WFL bilaterally with no discomfort R rotation 54 degrees L 40 degrees AROM 2/8 Ongoing   No episodes of neck pain greater then 3/10 for 2 weeks 5/10 2/15 Ongoing   Independent with HEP for flexibility Reports compliance; emphasized birddog and starting to build his conditioning 2/10 ongoing         Assessment/Plan     ASSESSMENT: He is doing well and his symptoms aren't as severe as they had previously been. He will likely have a lapse in his POC due to schedule availability which concerns me but we will have him on the active wait list.    PLAN: Assess his symptoms upon his return and progress accordingly.    SIGNATURE: Margarito Laboy [facility], [place] License #: [id number]  Electronically Signed on 2/17/2023        [address] [place] [address]. [address]  [phone]

## 2023-02-21 ENCOUNTER — OFFICE VISIT (OUTPATIENT)
Dept: INTERNAL MEDICINE | Facility: CLINIC | Age: 33
End: 2023-02-21
Payer: COMMERCIAL

## 2023-02-21 VITALS
RESPIRATION RATE: 16 BRPM | OXYGEN SATURATION: 98 % | DIASTOLIC BLOOD PRESSURE: 76 MMHG | SYSTOLIC BLOOD PRESSURE: 120 MMHG | HEIGHT: 68 IN | WEIGHT: 163.2 LBS | HEART RATE: 71 BPM | TEMPERATURE: 97.8 F | BODY MASS INDEX: 24.74 KG/M2

## 2023-02-21 DIAGNOSIS — M54.50 LUMBAR BACK PAIN: ICD-10-CM

## 2023-02-21 DIAGNOSIS — S39.012D STRAIN OF LUMBAR REGION, SUBSEQUENT ENCOUNTER: ICD-10-CM
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
## 2023-02-21 PROCEDURE — 99213 OFFICE O/P EST LOW 20 MIN: CPT | Performed by: INTERNAL MEDICINE

## 2023-02-21 RX ORDER — CYCLOBENZAPRINE HCL 10 MG
10 TABLET ORAL 3 TIMES DAILY PRN
Qty: 60 TABLET | Refills: 0 | Status: SHIPPED | OUTPATIENT
Start: 2023-02-21

## 2023-02-21 RX ORDER — MELOXICAM 15 MG/1
15 TABLET ORAL DAILY
Qty: 30 TABLET | Refills: 0 | Status: SHIPPED | OUTPATIENT
Start: 2023-02-21
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
## 2023-03-02 ENCOUNTER — TELEPHONE (OUTPATIENT)
Dept: INTERNAL MEDICINE | Facility: CLINIC | Age: 33
End: 2023-03-02

## 2023-03-02 NOTE — TELEPHONE ENCOUNTER
Caller: Jamar Bruno    Relationship to patient: Self    Best call back number:     [phone] (Mobile)       Patient is needing: pt said most of his pain has subsided; he is able to bend better than he could at the time of his office visit . He is asking if he needs to keep the appt on 3/15 for MRI or how to proceed. Please call to advise.

## 2023-03-09 NOTE — TELEPHONE ENCOUNTER
Will you please let him know if he is better we can hold off on the MRI until his follow up visit. At that time, we can always re-order if he is still having any issues. Thank you!

## 2024-06-20 ENCOUNTER — HOSPITAL ENCOUNTER (EMERGENCY)
Facility: HOSPITAL | Age: 34
Discharge: HOME | End: 2024-06-20
Payer: MEDICAID

## 2024-06-20 ENCOUNTER — APPOINTMENT (OUTPATIENT)
Dept: CT IMAGING | Facility: HOSPITAL | Age: 34
End: 2024-06-20
Payer: MEDICAID

## 2024-06-20 VITALS
OXYGEN SATURATION: 99 % | TEMPERATURE: 98 F | DIASTOLIC BLOOD PRESSURE: 71 MMHG | HEART RATE: 84 BPM | SYSTOLIC BLOOD PRESSURE: 140 MMHG | BODY MASS INDEX: 22.43 KG/M2 | RESPIRATION RATE: 18 BRPM | HEIGHT: 68 IN | WEIGHT: 148 LBS

## 2024-06-20 DIAGNOSIS — S00.03XA CONTUSION OF SCALP, INITIAL ENCOUNTER: Primary | ICD-10-CM

## 2024-06-20 DIAGNOSIS — S39.012A STRAIN OF LUMBAR REGION, INITIAL ENCOUNTER: ICD-10-CM

## 2024-06-20 DIAGNOSIS — S29.019A THORACIC MYOFASCIAL STRAIN, INITIAL ENCOUNTER: ICD-10-CM
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
## 2024-06-20 DIAGNOSIS — S16.1XXA STRAIN OF NECK MUSCLE, INITIAL ENCOUNTER: ICD-10-CM

## 2024-06-20 PROCEDURE — 72131 CT LUMBAR SPINE W/O DYE: CPT

## 2024-06-20 PROCEDURE — 99284 EMERGENCY DEPT VISIT MOD MDM: CPT

## 2024-06-20 PROCEDURE — 72128 CT CHEST SPINE W/O DYE: CPT

## 2024-06-20 PROCEDURE — 72125 CT NECK SPINE W/O DYE: CPT

## 2024-06-20 PROCEDURE — 70450 CT HEAD/BRAIN W/O DYE: CPT

## 2024-06-20 RX ORDER — KETOROLAC TROMETHAMINE 10 MG/1
10 TABLET, FILM COATED ORAL EVERY 6 HOURS PRN
Qty: 15 TABLET | Refills: 0 | Status: SHIPPED | OUTPATIENT
Start: 2024-06-20 | End: 2024-07-01

## 2024-06-20 RX ORDER — CYCLOBENZAPRINE HCL 10 MG
10 TABLET ORAL 3 TIMES DAILY PRN
Qty: 20 TABLET | Refills: 0 | Status: SHIPPED | OUTPATIENT
Start: 2024-06-20

## 2024-06-20 RX ORDER — ACETAMINOPHEN 500 MG
1000 TABLET ORAL ONCE
Status: COMPLETED | OUTPATIENT
Start: 2024-06-20 | End: 2024-06-20

## 2024-06-20 RX ADMIN — ACETAMINOPHEN 1000 MG: 500 TABLET, FILM COATED ORAL at 15:23
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
## 2024-06-20 NOTE — Clinical Note
[facility] EMERGENCY DEPARTMENT  [address]  [facility] [address]  Phone: [phone]    Jamar Bruno was seen and treated in our emergency department on 6/20/2024.  He may return to work on 06/24/2024.         Thank you for choosing [facility].    Roxana Turcios APRN

## 2024-06-20 NOTE — DISCHARGE INSTRUCTIONS
Return to ER if symptoms worsen   Ice to affected areas  Follow up with primary care provider next week

## 2024-07-01 ENCOUNTER — OFFICE VISIT (OUTPATIENT)
Dept: INTERNAL MEDICINE | Facility: CLINIC | Age: 34
End: 2024-07-01
Payer: COMMERCIAL

## 2024-07-01 VITALS
HEIGHT: 68 IN | DIASTOLIC BLOOD PRESSURE: 80 MMHG | HEART RATE: 73 BPM | WEIGHT: 146.9 LBS | TEMPERATURE: 97.5 F | OXYGEN SATURATION: 99 % | SYSTOLIC BLOOD PRESSURE: 118 MMHG | BODY MASS INDEX: 22.26 KG/M2
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
## 2024-07-01 DIAGNOSIS — M54.6 ACUTE RIGHT-SIDED THORACIC BACK PAIN: ICD-10-CM

## 2024-07-01 DIAGNOSIS — M41.25 OTHER IDIOPATHIC SCOLIOSIS, THORACOLUMBAR REGION: ICD-10-CM

## 2024-07-01 PROCEDURE — 99213 OFFICE O/P EST LOW 20 MIN: CPT | Performed by: INTERNAL MEDICINE

## 2024-07-01 RX ORDER — MELOXICAM 15 MG/1
15 TABLET ORAL DAILY
Qty: 60 TABLET | Refills: 0 | Status: SHIPPED | OUTPATIENT
Start: 2024-07-01
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
## 2024-07-05 ENCOUNTER — TREATMENT (OUTPATIENT)
Dept: PHYSICAL THERAPY | Facility: CLINIC | Age: 34
End: 2024-07-05
Payer: COMMERCIAL

## 2024-07-05 DIAGNOSIS — S29.019A STRAIN OF THORACIC REGION, INITIAL ENCOUNTER: Primary | ICD-10-CM

## 2024-07-05 DIAGNOSIS — M54.6 ACUTE BILATERAL THORACIC BACK PAIN: ICD-10-CM
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
## 2024-07-08 NOTE — PROGRESS NOTES
The clinical instructor and/or supervising staff, Martin Samuel PT, was present in clinic guiding the visit by approving, concurring, and confirming the skilled judgement for all services rendered.    Signature:  Martin Samuel PT, [place] License #: [id number]  Electronically signed on 7/8/2024

## 2024-07-10 ENCOUNTER — TREATMENT (OUTPATIENT)
Dept: PHYSICAL THERAPY | Facility: CLINIC | Age: 34
End: 2024-07-10
Payer: COMMERCIAL

## 2024-07-10 DIAGNOSIS — M54.6 ACUTE BILATERAL THORACIC BACK PAIN: ICD-10-CM

## 2024-07-10 DIAGNOSIS — S29.019A STRAIN OF THORACIC REGION, INITIAL ENCOUNTER: Primary | ICD-10-CM

## 2024-07-10 NOTE — PROGRESS NOTES
Physical Therapy Treatment Note  [address] [place] [address]    Patient: Jamar Bruno                                                 Visit Date: 7/10/2024  :     1990    Referring practitioner:    CORRY Vazquez MD  Date of Initial Visit:          Type: THERAPY  Noted: 2024    Patient seen for 2 sessions    Visit Diagnoses:    ICD-10-CM ICD-9-CM   1. Strain of thoracic region, initial encounter  S29.019A 847.1   2. Acute bilateral thoracic back pain  M54.6 724.1     SUBJECTIVE     Subjective: His back seems to be doing a little better.     PAIN: 5/10       OBJECTIVE     Objective     Therapeutic Exercises    16365 Units Comments   HEP instruction and performance     Standing bird dog  Tendency to hip hinge   Timed Minutes 30     Therapy Education/Self Care [id number]   Education offered today HEP instructed and trained   Deborah Code [id number]   Ongoing HEP     Date: 07/10/2024  Prepared by: Geena Seanz    Exercises  - Supine Lower Trunk Rotation  - 1 x daily - 4 x weekly - 2-3 sets - 10 reps - 5 sec hold hold  - Sidelying Thoracic and Shoulder Rotation  - 1 x daily - 4 x weekly - 2-3 sets - 10 reps - 5 sec hold  - Standing I's, Y's, T's  - 1 x daily - 4 x weekly - 2-3 sets - 10 reps  - Seated Thoracic Lumbar Extension  - 1 x daily - 4 x weekly - 2-3 sets - 10 reps   Timed Minutes        Total Timed Treatment:     30   mins  Total Time of Visit:             30   mins         ASSESSMENT/PLAN       GOALS:  Goals                                          Progress Note due by 2024                                                      Recert due by 10/3/2024   LTG by: 6 weeks Comments Date Status   Decrease pain to 4 or less for daily activities        Able to climb 10 stairs with no increase in pain       Patient able to lift 20 pounds from ground without increase in back pain      All LE MMTs to 5/5 without pain      Decrease  frequency of headaches to no more than 2/week      Independent with all HEP      Demonstrates good posture with sitting and standing as determined by the treating PT           Assessment/Plan     ASSESSMENT: He was able to perform gentle flexibility/ROM and postural mm strengthening w/o difficulty but required VC/TC for correction of tendency to hip hinge during standing birddog. Assigned HEP which was reviewed and trained.    PLAN: progress postural strengthening, endurance, education per pt tolerance w/ manual PRN for pain    SIGNATURE: Geena Saenz, PT DPT, [place] License #: [id number]  Electronically Signed on 7/10/2024        [facility] [place] [address]  [phone]

## 2024-07-24 ENCOUNTER — TELEPHONE (OUTPATIENT)
Dept: PHYSICAL THERAPY | Facility: CLINIC | Age: 34
End: 2024-07-24

## 2024-07-29 ENCOUNTER — TREATMENT (OUTPATIENT)
Dept: PHYSICAL THERAPY | Facility: CLINIC | Age: 34
End: 2024-07-29
Payer: COMMERCIAL

## 2024-07-29 DIAGNOSIS — M54.6 ACUTE BILATERAL THORACIC BACK PAIN: ICD-10-CM

## 2024-07-29 DIAGNOSIS — S29.019A STRAIN OF THORACIC REGION, INITIAL ENCOUNTER: Primary | ICD-10-CM

## 2024-07-29 PROCEDURE — 97110 THERAPEUTIC EXERCISES: CPT

## 2024-07-29 NOTE — PROGRESS NOTES
Physical Therapy Treatment Note  [address] [place] [address]    Patient: Jamar Bruno                                                 Visit Date: 2024  :     1990    Referring practitioner:    CORRY Vazquez MD  Date of Initial Visit:          Type: THERAPY  Noted: 2024    Patient seen for 3 sessions    Visit Diagnoses:    ICD-10-CM ICD-9-CM   1. Strain of thoracic region, initial encounter  S29.019A 847.1   2. Acute bilateral thoracic back pain  M54.6 724.1       SUBJECTIVE     Subjective: Pt feels back is improving.     PAIN: 5/10 in mid back        OBJECTIVE     Objective     Therapeutic Exercises    49721 Units Comments   Supermans 2 x 10 Reported this exercise felt good   Standing goodmornings with theraball  Alternated to seated   Alt deadbug  Attempted; lacks muscle coordination.    Seated good mornings   Lightly weighted bar;    Quadruped bird dogs  Utilizing orange ball. Required VC and tactile cues to prevent hip rotation   Standing bird dog  VC for squared hips   Timed Minutes 30     Therapy Education/Self Care [id number]   Education offered today HEP instructed and trained   Deborah Code [id number]   Ongoing HEP     Date: 07/10/2024  Prepared by: Geena Saenz    Exercises  - Supine Lower Trunk Rotation  - 1 x daily - 4 x weekly - 2-3 sets - 10 reps - 5 sec hold hold  - Sidelying Thoracic and Shoulder Rotation  - 1 x daily - 4 x weekly - 2-3 sets - 10 reps - 5 sec hold  - Standing I's, Y's, T's  - 1 x daily - 4 x weekly - 2-3 sets - 10 reps  - Seated Thoracic Lumbar Extension  - 1 x daily - 4 x weekly - 2-3 sets - 10 reps   Timed Minutes        Total Timed Treatment:     30   mins  Total Time of Visit:             30   mins         ASSESSMENT/PLAN       GOALS:  Goals                                          Progress Note due by 2024                                                      Recert due by 10/3/2024    LTG by: 6 weeks Comments Date Status   Decrease pain to 4 or less for daily activities        Able to climb 10 stairs with no increase in pain       Patient able to lift 20 pounds from ground without increase in back pain      All LE MMTs to 5/5 without pain      Decrease frequency of headaches to no more than 2/week Last reported HA ~2 weeks ago 7/29 MET   Independent with all HEP      Demonstrates good posture with sitting and standing as determined by the treating PT           Assessment/Plan     ASSESSMENT: Pt able to perform therapeutic exercises with no increase in pain. Pt reports back extension exercises feel very good on his back. Core exercise (deadbug) attempted to compliment back exercises for increased strength in trunk.     PLAN: progress postural strengthening, endurance, education per pt tolerance w/ manual PRN for pain. Continue current POC.     SIGNATURE: Yani Dillard, PT, [place] License #:[id number]  Electronically Signed on 7/29/2024        [facility] [place] [address]  [phone]

## 2024-07-31 ENCOUNTER — TELEPHONE (OUTPATIENT)
Dept: ORTHOPEDICS | Facility: OTHER | Age: 34
End: 2024-07-31
Payer: COMMERCIAL

## 2024-08-02 NOTE — PROGRESS NOTES
The clinical instructor and/or supervising staff, Martin Samuel PT, was present in clinic guiding the visit by approving, concurring, and confirming the skilled judgement for all services rendered.    Signature:  Martin Samuel PT, [place] License #: [id number]  Electronically signed on 8/1/2024

## 2024-08-05 ENCOUNTER — TREATMENT (OUTPATIENT)
Dept: PHYSICAL THERAPY | Facility: CLINIC | Age: 34
End: 2024-08-05
Payer: COMMERCIAL

## 2024-08-05 DIAGNOSIS — M54.6 ACUTE BILATERAL THORACIC BACK PAIN: ICD-10-CM

## 2024-08-05 DIAGNOSIS — S29.019A STRAIN OF THORACIC REGION, INITIAL ENCOUNTER: Primary | ICD-10-CM

## 2024-08-05 PROCEDURE — 97110 THERAPEUTIC EXERCISES: CPT

## 2024-08-05 PROCEDURE — 97140 MANUAL THERAPY 1/> REGIONS: CPT

## 2024-08-05 NOTE — PROGRESS NOTES
Progress Note Addendum      Patient: Jamar Bruno           : 1990  Visit Date: 2024  Referring practitioner: CORRY Vazquez MD  Date of Initial Visit: Type: THERAPY  Noted: 2024  Patient seen for 12 sessions  Visit Diagnoses:    ICD-10-CM ICD-9-CM   1. Strain of thoracic region, initial encounter  S29.019A 847.1   2. Acute bilateral thoracic back pain  M54.6 724.1       PT G-Codes  Outcome Measure Options: Modified Oswestry  Clinical Progress: improved  Home Program Compliance: Yes  Progress toward previous goals: Partially Met  Prognosis to achieve goals: good    Objective     Assessment & Plan       Assessment  Impairments: abnormal muscle firing, abnormal muscle tone, activity intolerance, impaired physical strength, lacks appropriate home exercise program and pain with function   Functional limitations: carrying objects, lifting, walking, pulling, pushing and uncomfortable because of pain   Prognosis: good    Plan  Therapy options: will be seen for skilled therapy services  Planned modality interventions: low level laser therapy, thermotherapy (hydrocollator packs), traction, electrical stimulation/Russian stimulation, high voltage pulsed current (pain management), dry needling, microcurrent electrical stimulation and TENS  Planned therapy interventions: abdominal trunk stabilization, manual therapy, neuromuscular re-education, body mechanics training, functional ROM exercises, home exercise program, joint mobilization, spinal/joint mobilization, strengthening, therapeutic activities, stretching, soft tissue mobilization, postural training and flexibility  Frequency: 2x week  Duration in weeks: 6  Treatment plan discussed with: patient        I reviewed the treatment and goals with Margarito Laboy PTA and agree with the POC.    SIGNATURE: Yani Dillard PT, License #: [id number]  Electronically Signed on 2024

## 2024-08-05 NOTE — PROGRESS NOTES
"                                                                Physical Therapy Treatment Note and 30 Day Progress Note  [address] [place] [address]    Patient: Jamar Bruno                                                 Visit Date: 2024  :     1990    Referring practitioner:    CORRY Vazquez MD  Date of Initial Visit:          No linked episodes    Patient seen for 4 sessions    Visit Diagnoses:    ICD-10-CM ICD-9-CM   1. Strain of thoracic region, initial encounter  S29.019A 847.1   2. Acute bilateral thoracic back pain  M54.6 724.1     SUBJECTIVE     Subjective:He reports his back pain is alright      PAIN: 6/10    PT G-Codes  Outcome Measure Options: Modified Oswestry    OBJECTIVE     Objective     Therapeutic Exercises    96182 Units Comments   MMT B LE     Stair ambulation x 1     #20 box lift     Obtained updated TEJAS (see outcome measures)     Prone \"T's\" #2 x 20      Progressive thoracic stretch on 1/2 foam roller               Timed Minutes 25      LE MMT   HIP Strength L Strength R   flexion 5/5 4+/5   extension 5+/5 5+/5   ABD 4+/5    ADD      IR     ER          KNEE     flexion 4-/5 4-/5   extension 5/5 4+/5         ANKLE     dorsiflexion 5/5 5/5   plantarflexion     eversion     inversion     *pain   Manual Therapy     01723  Comments   STM B mid thoracic to lower lumbar region  prone                   Timed Minutes 15           Therapy Education/Self Care 00280   Education offered today    [facility] Code [email or web address]    Ongoing HEP   Date: 07/10/2024  Prepared by: Geena Saenz     Exercises  - Supine Lower Trunk Rotation  - 1 x daily - 4 x weekly - 2-3 sets - 10 reps - 5 sec hold hold  - Sidelying Thoracic and Shoulder Rotation  - 1 x daily - 4 x weekly - 2-3 sets - 10 reps - 5 sec hold  - Standing I's, Y's, T's  - 1 x daily - 4 x weekly - 2-3 sets - 10 reps  - Seated Thor   Timed Minutes        Total Timed Treatment:     40   mins  Total Time of Visit:             40   mins "         ASSESSMENT/PLAN     GOALS  Goals                                          Progress Note due by 9/4/2024                                                      Recert due by 10/3/2024   LTG by: 6 weeks Comments Date Status   Decrease pain to 4 or less for daily activities     6/10 rating today  8/5  Ongoing   Able to climb 10 stairs with no increase in pain   no increase in pain  8/5  Ongoing   Patient able to lift 20 pounds from ground without increase in back pain  reported didn't increase activity  8/5  Ongoing   All LE MMTs to 5/5 without pain  see table  8/5  Ongoing   Decrease frequency of headaches to no more than 2/week Last reported HA ~2 weeks ago 7/29 MET   Independent with all HEP  reinforced today  8/5  Ongoing   Demonstrates good posture with sitting and standing as determined by the treating PT   poor posture noted  8/5  Ongoing       Assessment/Plan     ASSESSMENT:   His posture likely keeps his symptoms flared as he has a postural strain. His B LE MMT's were good overall but he did have some minor weakness with MMT. He's also had multiple cancellations during this POC and today was his third treatment session.    PLAN:   Continue to focus on postural awareness and improve overall flexibility.    SIGNATURE: Margarito Laboy PTA, [place] License #: [id number]  Electronically Signed on 8/5/2024        [facility] [place] [address]  [phone]

## 2024-08-07 ENCOUNTER — TREATMENT (OUTPATIENT)
Dept: PHYSICAL THERAPY | Facility: CLINIC | Age: 34
End: 2024-08-07
Payer: COMMERCIAL

## 2024-08-07 DIAGNOSIS — M54.6 ACUTE BILATERAL THORACIC BACK PAIN: ICD-10-CM

## 2024-08-07 DIAGNOSIS — S29.019A STRAIN OF THORACIC REGION, INITIAL ENCOUNTER: Primary | ICD-10-CM

## 2024-08-07 PROCEDURE — 97140 MANUAL THERAPY 1/> REGIONS: CPT

## 2024-08-07 PROCEDURE — 97110 THERAPEUTIC EXERCISES: CPT

## 2024-08-07 NOTE — PROGRESS NOTES
Physical Therapy Treatment Note  [address], [place] [address]    Patient: Jamar Bruno                                                 Visit Date: 2024  :     1990    Referring practitioner:    CORRY Vazquez MD  Date of Initial Visit:          Type: THERAPY  Noted: 2024    Patient seen for 4 sessions    Visit Diagnoses:    ICD-10-CM ICD-9-CM   1. Strain of thoracic region, initial encounter  S29.019A 847.1   2. Acute bilateral thoracic back pain  M54.6 724.1       SUBJECTIVE     Subjective:C/o pain at the cervicothoracic junction.       PAIN: 5/10         OBJECTIVE     Objective     Therapeutic Exercises    88977 Units Comments   Chin tucks 1 x10 Seated --> supine for better positioning   Thoracic extension throughout entire thoracic on theraball with HL table starting at low to upper thoracic 3 reps at each segment; more pain/difficulty at lower thoracic    Scapular retractions 1 x 10    UE phasic unweighted and with RTB 2 x 10    Seated Rows with RTB 2 x 10                    Timed Minutes 30        Manual Therapy     61843  Comments   STM to upper traps and cervical paraspinals prone   Supine on half roller (parallel to spine) with manual pressure to shoulders                 Timed Minutes 12           Therapy Education/Self Care 01926   Education offered today    [facility] Code [email or web address]    Ongoing HEP   Access Code: [email or web address]  URL: [email or web address]  Date: 2024  Prepared by: Yani Dillard    Exercises  - Supine Lower Trunk Rotation  - 1 x daily - 4 x weekly - 2-3 sets - 10 reps - 5 sec hold hold  - Sidelying Thoracic and Shoulder Rotation  - 1 x daily - 4 x weekly - 2-3 sets - 10 reps - 5 sec hold  - Standing I's, Y's, T's  - 1 x daily - 4 x weekly - 2-3 sets - 10 reps  - Seated Thoracic Lumbar Extension  - 1 x daily - 4 x weekly - 2-3 sets - 10 reps  - Seated Scapular Retraction  - 1 x daily - 7 x  weekly - 3 sets - 10 reps  - Shoulder External Rotation and Scapular Retraction with Resistance  - 1 x daily - 7 x weekly - 3 sets - 10 reps  - Supine Chin Tucks on Flat Ball  - 1 x daily - 7 x weekly - 3 sets - 10 reps   Timed Minutes        Total Timed Treatment:     42   mins  Total Time of Visit:             42   mins         ASSESSMENT/PLAN     GOALS  Goals                                          Progress Note due by 9/4/2024                                                      Recert due by 10/3/2024   LTG by: 6 weeks Comments Date Status   Decrease pain to 4 or less for daily activities     6/10 rating today  8/5  Ongoing   Able to climb 10 stairs with no increase in pain   no increase in pain  8/5  Ongoing   Patient able to lift 20 pounds from ground without increase in back pain  reported didn't increase activity  8/5  Ongoing   All LE MMTs to 5/5 without pain  see table  8/5  Ongoing   Decrease frequency of headaches to no more than 2/week Last reported HA ~2 weeks ago 7/29 MET   Independent with all HEP  Reviewed new exercises added to HEP today; provided RTB  8/7  Ongoing   Demonstrates good posture with sitting and standing as determined by the treating PT   poor posture noted  8/5  Ongoing       Assessment/Plan     ASSESSMENT:   Pt complains of pain at cervicothoracic junction. Upon examination, pt tends to hold neck in extension requiring multiple verbal and tactile cues for correction. Pt unable to understand/perform seated chin tucks, able to perform supine with correct postural positioning. Thoracic extension painful at lower thoracic, wnl and painless in upper thoracic segments.       PLAN:   Continue to focus on postural awareness especially in cervicothoracic junction. Incorporate postural strengthening exercises.     SIGNATURE: Yani Dillard PT,DPT [place] License #: [id number]  Electronically Signed on 8/7/2024        [address] [place] [address] [place] [address]  [phone]

## 2024-08-12 ENCOUNTER — TREATMENT (OUTPATIENT)
Dept: PHYSICAL THERAPY | Facility: CLINIC | Age: 34
End: 2024-08-12
Payer: COMMERCIAL

## 2024-08-12 DIAGNOSIS — M54.6 ACUTE BILATERAL THORACIC BACK PAIN: ICD-10-CM

## 2024-08-12 DIAGNOSIS — S29.019A STRAIN OF THORACIC REGION, INITIAL ENCOUNTER: Primary | ICD-10-CM

## 2024-08-12 PROCEDURE — 97140 MANUAL THERAPY 1/> REGIONS: CPT

## 2024-08-12 PROCEDURE — 97110 THERAPEUTIC EXERCISES: CPT

## 2024-08-12 NOTE — PROGRESS NOTES
Physical Therapy Treatment Note  [address] [place] [address]    Patient: Jamar Bruno                                                 Visit Date: 2024  :     1990    Referring practitioner:    CORRY Vazquez MD  Date of Initial Visit:          Type: THERAPY  Noted: 2024    Patient seen for 4 sessions    Visit Diagnoses:    ICD-10-CM ICD-9-CM   1. Strain of thoracic region, initial encounter  S29.019A 847.1   2. Acute bilateral thoracic back pain  M54.6 724.1         SUBJECTIVE     Subjective:C/o pain at the cervicothoracic junction. Felt fine after last session.    PAIN: 6/10 > improved       OBJECTIVE     Objective     Therapeutic Exercises    84021 Units Comments   Rotation PNF stretching     Lateral flexion PNF stretching     Prone over SB + chin ret 2*10 Significant difficulty w/ technique, heavy manual cueing needed   HL on HFR, iso ABD + F 10 Yellow TB   HL on HFR, pro/ret 10 B 4#   Timed Minutes 30        Manual Therapy     55548  Comments   STM to SCM upper traps and cervical paraspinals prone   Cervical UPA grade 1/2 TTP along C4/5 R   Timed Minutes 12           Therapy Education/Self Care 45432   Education offered today    [facility] Code [email or web address]    Ongoing HEP     Date: 2024  Prepared by: Yani Dillard    Exercises  - Supine Lower Trunk Rotation  - 1 x daily - 4 x weekly - 2-3 sets - 10 reps - 5 sec hold hold  - Sidelying Thoracic and Shoulder Rotation  - 1 x daily - 4 x weekly - 2-3 sets - 10 reps - 5 sec hold  - Standing I's, Y's, T's  - 1 x daily - 4 x weekly - 2-3 sets - 10 reps  - Seated Thoracic Lumbar Extension  - 1 x daily - 4 x weekly - 2-3 sets - 10 reps  - Seated Scapular Retraction  - 1 x daily - 7 x weekly - 3 sets - 10 reps  - Shoulder External Rotation and Scapular Retraction with Resistance  - 1 x daily - 7 x weekly - 3 sets - 10 reps  - Supine Chin Tucks on Flat Ball  - 1 x daily - 7 x  weekly - 3 sets - 10 reps   Timed Minutes        Total Timed Treatment:     42   mins  Total Time of Visit:             42   mins         ASSESSMENT/PLAN     GOALS  Goals                                          Progress Note due by 9/4/2024                                                      Recert due by 10/3/2024   LTG by: 6 weeks Comments Date Status   Decrease pain to 4 or less for daily activities     6/10 rating today  8/5  Ongoing   Able to climb 10 stairs with no increase in pain   no increase in pain  8/5  Ongoing   Patient able to lift 20 pounds from ground without increase in back pain  reported didn't increase activity  8/5  Ongoing   All LE MMTs to 5/5 without pain  see table  8/5  Ongoing   Decrease frequency of headaches to no more than 2/week Last reported HA ~2 weeks ago 7/29 MET   Independent with all HEP  Reviewed new exercises added to HEP today; provided RTB  8/7  Ongoing   Demonstrates good posture with sitting and standing as determined by the treating PT   poor posture noted  8/5  Ongoing       Assessment/Plan     ASSESSMENT: Activation of deep cervical postural mm w/o compensatory cervical extension continues to be difficult for him. He is benefiting from active approach and current HEP.    PLAN: Continue to focus on postural awareness especially in cervicothoracic junction. Incorporate postural strengthening exercises.     SIGNATURE: Geena Saenz PT DPT,[place] License #: [id number]  Electronically Signed on 8/12/2024        [place] [address]  [phone]

## 2024-08-14 ENCOUNTER — TREATMENT (OUTPATIENT)
Dept: PHYSICAL THERAPY | Facility: CLINIC | Age: 34
End: 2024-08-14
Payer: COMMERCIAL

## 2024-08-14 DIAGNOSIS — M54.6 ACUTE BILATERAL THORACIC BACK PAIN: ICD-10-CM

## 2024-08-14 DIAGNOSIS — S29.019A STRAIN OF THORACIC REGION, INITIAL ENCOUNTER: Primary | ICD-10-CM

## 2024-08-14 PROCEDURE — 97110 THERAPEUTIC EXERCISES: CPT | Performed by: PHYSICAL THERAPIST

## 2024-08-14 PROCEDURE — 97140 MANUAL THERAPY 1/> REGIONS: CPT | Performed by: PHYSICAL THERAPIST

## 2024-08-14 NOTE — PROGRESS NOTES
Physical Therapy Treatment Note  [address] [place] [address]    Patient: Jamar Bruno                                                 Visit Date: 2024  :     1990    Referring practitioner:    CORRY Vazquez MD  Date of Initial Visit:          Type: THERAPY  Noted: 2024    Patient seen for 4 sessions    Visit Diagnoses:    ICD-10-CM ICD-9-CM   1. Strain of thoracic region, initial encounter  S29.019A 847.1   2. Acute bilateral thoracic back pain  M54.6 724.1         SUBJECTIVE     Subjective: States he still has pain in the same spot, but it is feeling better. Notes everything feels a little looser and is moving better.    PAIN: 4/10       OBJECTIVE     Objective     Therapeutic Exercises    64885 Units Comments   Supine hor sh ABD on towel roll 2x10 GTB    Open books @ wall w/ GTB 10x3 sec    Standing rhomboid stretch 30 sec ea    Supine BUE phasic on towel roll  2x10 GTB         Timed Minutes 24        Manual Therapy     54265  Comments   Seated pec stretch w/ bolster    STM to B UT/LS    TPR to R UT/LS    IASTM w/ Powerboost L1 ball to R UT/LS    Timed Minutes 16        Therapy Education/Self Care 92904   Education offered today    [facility] Code [id number]    Ongoing HEP     Date: 2024  Prepared by: Yani Dillard    Exercises  - Supine Lower Trunk Rotation  - 1 x daily - 4 x weekly - 2-3 sets - 10 reps - 5 sec hold hold  - Sidelying Thoracic and Shoulder Rotation  - 1 x daily - 4 x weekly - 2-3 sets - 10 reps - 5 sec hold  - Standing I's, Y's, T's  - 1 x daily - 4 x weekly - 2-3 sets - 10 reps  - Seated Thoracic Lumbar Extension  - 1 x daily - 4 x weekly - 2-3 sets - 10 reps  - Seated Scapular Retraction  - 1 x daily - 7 x weekly - 3 sets - 10 reps  - Shoulder External Rotation and Scapular Retraction with Resistance  - 1 x daily - 7 x weekly - 3 sets - 10 reps  - Supine Chin Tucks on Flat Ball  - 1 x daily - 7 x  weekly - 3 sets - 10 reps   Timed Minutes        Total Timed Treatment:     40   mins  Total Time of Visit:             40   mins         ASSESSMENT/PLAN     GOALS  Goals                                          Progress Note due by 9/4/2024                                                      Recert due by 10/3/2024   LTG by: 6 weeks Comments Date Status   Decrease pain to 4 or less for daily activities     6/10 rating today  8/5  Ongoing   Able to climb 10 stairs with no increase in pain   no increase in pain  8/5  Ongoing   Patient able to lift 20 pounds from ground without increase in back pain  reported didn't increase activity  8/5  Ongoing   All LE MMTs to 5/5 without pain  see table  8/5  Ongoing   Decrease frequency of headaches to no more than 2/week Last reported HA ~2 weeks ago 7/29 MET   Independent with all HEP  Reviewed new exercises added to HEP today; provided RTB  8/7  Ongoing   Demonstrates good posture with sitting and standing as determined by the treating PT   poor posture noted  8/5  Ongoing       Assessment/Plan     ASSESSMENT: Pt reported decreased pain today, noting he feels looser and like he is moving better. He had no discomfort during exercises. He presented with cont R UT/LS tightness and TP activity, but reported decreased pain and improved Cx mobility following treatment.    PLAN: Continue to focus on postural awareness especially in cervicothoracic junction. Incorporate postural strengthening exercises.     SIGNATURE: Hong Fountain PTA,DPT [place] License #: [id number]  Electronically Signed on 8/14/2024        [facility] [place] [address]  [phone]

## 2024-08-20 ENCOUNTER — TELEPHONE (OUTPATIENT)
Dept: INTERNAL MEDICINE | Facility: CLINIC | Age: 34
End: 2024-08-20
Payer: COMMERCIAL

## 2024-08-20 NOTE — TELEPHONE ENCOUNTER
Caller: Jamar Bruno    Relationship: Self    Best call back number: [phone]     What is the best time to reach you: ANYTIME    Who are you requesting to speak with (clinical staff, provider,  specific staff member): CLINICAL    What was the call regarding: WANTS TO DISCUSS HIS PHYSICAL THERAPY VISITS    Is it okay if the provider responds through MyChart: NO

## 2024-08-20 NOTE — TELEPHONE ENCOUNTER
PATIENT HAS BEEN CALLED, HE STATED THAT HE IS FEELING FINE AND HAS WENT BACK TO WORK WITH NO RESTRICTIONS.  HE STATED THAT THERE HAVE BEEN OTHER APPOINTMENTS ADDED BUT PATIENT STATED THAT HE DOES NOT NEED THEM.  HE IS DOING GOOD.  HE JUST WANTED TO LET DR. PORTER OFFICE KNOW.

## 2024-08-23 ENCOUNTER — TELEPHONE (OUTPATIENT)
Dept: INTERNAL MEDICINE | Facility: CLINIC | Age: 34
End: 2024-08-23
Payer: COMMERCIAL

## 2024-08-23 NOTE — TELEPHONE ENCOUNTER
PATIENT HAS BEEN CALLED, HE STATED THAT HE IS FEELING FINE AND HAS WENT BACK TO WORK WITH NO RESTRICTIONS. HE STATED THAT THERE HAVE BEEN OTHER APPOINTMENTS ADDED BUT PATIENT STATED THAT HE DOES NOT NEED THEM. HE IS DOING GOOD. HE JUST WANTED TO LET DR. PORTER OFFICE KNOW      PATIENT HAS CALLED AGAIN AND IS ASKING IF DR. GAN IS IN AGREEMENT WITH PATIENT STOPPING PHYSICAL THERAPY.  HE STATED THAT PHYSICAL THERAPY TOLD PATIENT TO CONTACT DR. GAN.

## 2024-08-23 NOTE — TELEPHONE ENCOUNTER
I am glad he is feeling better.  I am okay with him stopping physical therapy if physical therapy does not believe he needs any additional appointments.

## 2024-10-01 ENCOUNTER — TELEPHONE (OUTPATIENT)
Dept: INTERNAL MEDICINE | Facility: CLINIC | Age: 34
End: 2024-10-01